# Patient Record
Sex: FEMALE | Race: OTHER | Employment: UNEMPLOYED | URBAN - METROPOLITAN AREA
[De-identification: names, ages, dates, MRNs, and addresses within clinical notes are randomized per-mention and may not be internally consistent; named-entity substitution may affect disease eponyms.]

---

## 2017-03-09 ENCOUNTER — HOSPITAL ENCOUNTER (EMERGENCY)
Age: 37
Discharge: HOME | End: 2017-03-09
Attending: EMERGENCY MEDICINE
Payer: OTHER GOVERNMENT

## 2017-03-09 ENCOUNTER — APPOINTMENT (OUTPATIENT)
Dept: GENERAL RADIOLOGY | Age: 37
End: 2017-03-09
Payer: OTHER GOVERNMENT

## 2017-03-09 VITALS
OXYGEN SATURATION: 99 % | HEIGHT: 62 IN | TEMPERATURE: 98.6 F | HEART RATE: 86 BPM | SYSTOLIC BLOOD PRESSURE: 98 MMHG | DIASTOLIC BLOOD PRESSURE: 66 MMHG | BODY MASS INDEX: 23.55 KG/M2 | WEIGHT: 128 LBS | RESPIRATION RATE: 20 BRPM

## 2017-03-09 DIAGNOSIS — T14.8XXA MUSCLE STRAIN: Primary | ICD-10-CM

## 2017-03-09 LAB — HCG UR QL: NEGATIVE

## 2017-03-09 PROCEDURE — 74011250637 HC RX REV CODE- 250/637: Performed by: PHYSICIAN ASSISTANT

## 2017-03-09 PROCEDURE — 81025 URINE PREGNANCY TEST: CPT

## 2017-03-09 PROCEDURE — 71020 XR CHEST PA LAT: CPT

## 2017-03-09 PROCEDURE — 99284 EMERGENCY DEPT VISIT MOD MDM: CPT | Performed by: PHYSICIAN ASSISTANT

## 2017-03-09 RX ORDER — METHOCARBAMOL 750 MG/1
750 TABLET, FILM COATED ORAL
Status: COMPLETED | OUTPATIENT
Start: 2017-03-09 | End: 2017-03-09

## 2017-03-09 RX ORDER — LEVOTHYROXINE SODIUM 50 UG/1
TABLET ORAL
COMMUNITY
End: 2017-04-05 | Stop reason: SDDI

## 2017-03-09 RX ORDER — METHOCARBAMOL 750 MG/1
750 TABLET, FILM COATED ORAL 2 TIMES DAILY
Qty: 14 TAB | Refills: 0 | Status: SHIPPED | OUTPATIENT
Start: 2017-03-09 | End: 2017-03-16

## 2017-03-09 RX ADMIN — METHOCARBAMOL 750 MG: 750 TABLET ORAL at 18:58

## 2017-03-09 NOTE — ED PROVIDER NOTES
Patient is a 43 y.o. female presenting with back pain. The history is provided by the patient. Back Pain    This is a new problem. The current episode started more than 2 days ago. The problem has not changed since onset. The problem occurs constantly. The pain is associated with no known injury. The pain is present in the left side. The quality of the pain is described as aching. The pain does not radiate. The pain is at a severity of 8/10. The pain is mild. The pain is the same all the time. Pertinent negatives include no tingling and no weakness. She has tried nothing for the symptoms. The treatment provided no relief. Past Medical History:   Diagnosis Date    Endocrine disease     hypothyroidism       History reviewed. No pertinent surgical history. History reviewed. No pertinent family history. Social History     Social History    Marital status:      Spouse name: N/A    Number of children: N/A    Years of education: N/A     Occupational History    Not on file. Social History Main Topics    Smoking status: Not on file    Smokeless tobacco: Not on file    Alcohol use Not on file    Drug use: Not on file    Sexual activity: Not on file     Other Topics Concern    Not on file     Social History Narrative    No narrative on file         ALLERGIES: Review of patient's allergies indicates no known allergies. Review of Systems   Musculoskeletal: Positive for back pain. Neurological: Negative for tingling and weakness. All other systems reviewed and are negative. Vitals:    03/09/17 1759   BP: 98/66   Pulse: 86   Resp: 20   Temp: 98.6 °F (37 °C)   SpO2: 99%   Weight: 58.1 kg (128 lb)   Height: 5' 2\" (1.575 m)            Physical Exam   Constitutional: She is oriented to person, place, and time. She appears well-developed and well-nourished. No distress. HENT:   Head: Normocephalic and atraumatic.    Eyes: Conjunctivae and EOM are normal. Pupils are equal, round, and reactive to light. Neck: Normal range of motion. Neck supple. Cardiovascular: Normal rate and regular rhythm. Pulmonary/Chest: Effort normal and breath sounds normal. No respiratory distress. She has no wheezes. Abdominal: Soft. Bowel sounds are normal. There is no tenderness. There is no rebound and no guarding. Musculoskeletal: She exhibits tenderness. She exhibits no edema. Back:    Left posterior rib area w/o skin changes, no swelling, lungs clear    Neurological: She is alert and oriented to person, place, and time. Skin: Skin is warm and dry. Psychiatric: She has a normal mood and affect. Nursing note and vitals reviewed.        MDM  Number of Diagnoses or Management Options  Diagnosis management comments: Chest x ray negative, do not feel this is singles at this time due to symptoms x 3 days and no skin changes, will give short course of robaxin for muscular pain       Amount and/or Complexity of Data Reviewed  Clinical lab tests: ordered and reviewed  Tests in the radiology section of CPT®: ordered and reviewed    Risk of Complications, Morbidity, and/or Mortality  Presenting problems: low  Diagnostic procedures: low  Management options: low    Patient Progress  Patient progress: improved    ED Course       Procedures

## 2017-03-09 NOTE — ED TRIAGE NOTES
Patient c/o left sided upper back pain x 1 week. Patient states pain increases with movement. Patient states she feels like \"something is moving there. \"  Patient denies rashes to her skin.

## 2017-03-10 NOTE — DISCHARGE INSTRUCTIONS
Muscle Strain: Care Instructions  Your Care Instructions  A muscle strain happens when you overstretch, or pull, a muscle. It can happen when you exercise or lift something or when you have an accident. Rest and other home care can help the muscle heal.  Follow-up care is a key part of your treatment and safety. Be sure to make and go to all appointments, and call your doctor if you are having problems. Its also a good idea to know your test results and keep a list of the medicines you take. How can you care for yourself at home? · Rest the strained muscle. Do not put weight on it for a day or two. If your doctor advises you to, use crutches or a sling to rest a sore limb. · Put ice or a cold pack on the sore muscle for 10 to 20 minutes at a time to stop swelling. Put a thin cloth between the ice pack and your skin. · Prop up the sore arm or leg on a pillow when you ice it or anytime you sit or lie down during the next 3 days. Try to keep it above the level of your heart. This will help reduce swelling. · Take pain medicines exactly as directed. ¨ If the doctor gave you a prescription medicine for pain, take it as prescribed. ¨ If you are not taking a prescription pain medicine, ask your doctor if you can take an over-the-counter medicine. · Do not do anything that makes the pain worse. Return to exercise gradually as you feel better. When should you call for help? Call your doctor now or seek immediate medical care if:  · You have new severe pain. · Your injured limb is cool or pale or changes color. · You have tingling, weakness, or numbness in your injured limb. · You cannot move the injured area. Watch closely for changes in your health, and be sure to contact your doctor if:  · You cannot put weight on a joint, or it feels unsteady when you walk. · Pain and swelling get worse or do not start to get better after 2 days of home treatment. Where can you learn more?   Go to http://nan-dandy.info/. Enter P728 in the search box to learn more about \"Muscle Strain: Care Instructions. \"  Current as of: May 23, 2016  Content Version: 11.1  © 7876-5471 [employer]. Care instructions adapted under license by [employer] (which disclaims liability or warranty for this information). If you have questions about a medical condition or this instruction, always ask your healthcare professional. [place] [address] any warranty or liability for your use of this information.

## 2018-03-12 ENCOUNTER — HOSPITAL ENCOUNTER (EMERGENCY)
Age: 38
Discharge: HOME | End: 2018-03-12
Attending: EMERGENCY MEDICINE
Payer: OTHER GOVERNMENT

## 2018-03-12 VITALS
DIASTOLIC BLOOD PRESSURE: 77 MMHG | SYSTOLIC BLOOD PRESSURE: 123 MMHG | HEIGHT: 61 IN | OXYGEN SATURATION: 98 % | WEIGHT: 113 LBS | BODY MASS INDEX: 21.34 KG/M2 | HEART RATE: 74 BPM | TEMPERATURE: 98 F | RESPIRATION RATE: 16 BRPM

## 2018-03-12 DIAGNOSIS — K29.90 GASTRITIS AND DUODENITIS: Primary | ICD-10-CM

## 2018-03-12 LAB
ALBUMIN SERPL-MCNC: 4.3 G/DL (ref 3.5–5)
ALBUMIN/GLOB SERPL: 1 {RATIO} (ref 1.2–3.5)
ALP SERPL-CCNC: 49 U/L (ref 50–136)
ALT SERPL-CCNC: 23 U/L (ref 12–65)
ANION GAP SERPL CALC-SCNC: 15 MMOL/L (ref 7–16)
AST SERPL-CCNC: 18 U/L (ref 15–37)
BACTERIA URNS QL MICRO: ABNORMAL /HPF
BASOPHILS # BLD: 0 K/UL (ref 0–0.2)
BASOPHILS NFR BLD: 0 % (ref 0–2)
BILIRUB SERPL-MCNC: 0.9 MG/DL (ref 0.2–1.1)
BUN SERPL-MCNC: 18 MG/DL (ref 6–23)
CALCIUM SERPL-MCNC: 9.4 MG/DL (ref 8.3–10.4)
CASTS URNS QL MICRO: ABNORMAL /LPF
CHLORIDE SERPL-SCNC: 101 MMOL/L (ref 98–107)
CO2 SERPL-SCNC: 26 MMOL/L (ref 21–32)
CREAT SERPL-MCNC: 0.76 MG/DL (ref 0.6–1)
CRYSTALS URNS QL MICRO: 0 /LPF
DIFFERENTIAL METHOD BLD: ABNORMAL
EOSINOPHIL # BLD: 0 K/UL (ref 0–0.8)
EOSINOPHIL NFR BLD: 0 % (ref 0.5–7.8)
EPI CELLS #/AREA URNS HPF: ABNORMAL /HPF
ERYTHROCYTE [DISTWIDTH] IN BLOOD BY AUTOMATED COUNT: 13.2 % (ref 11.9–14.6)
GLOBULIN SER CALC-MCNC: 4.2 G/DL (ref 2.3–3.5)
GLUCOSE SERPL-MCNC: 96 MG/DL (ref 65–100)
HCG UR QL: NEGATIVE
HCT VFR BLD AUTO: 43.4 % (ref 35.8–46.3)
HGB BLD-MCNC: 15.1 G/DL (ref 11.7–15.4)
IMM GRANULOCYTES # BLD: 0 K/UL (ref 0–0.5)
IMM GRANULOCYTES NFR BLD AUTO: 0 % (ref 0–5)
LIPASE SERPL-CCNC: 137 U/L (ref 73–393)
LYMPHOCYTES # BLD: 1 K/UL (ref 0.5–4.6)
LYMPHOCYTES NFR BLD: 11 % (ref 13–44)
MCH RBC QN AUTO: 30.1 PG (ref 26.1–32.9)
MCHC RBC AUTO-ENTMCNC: 34.8 G/DL (ref 31.4–35)
MCV RBC AUTO: 86.5 FL (ref 79.6–97.8)
MONOCYTES # BLD: 1 K/UL (ref 0.1–1.3)
MONOCYTES NFR BLD: 11 % (ref 4–12)
MUCOUS THREADS URNS QL MICRO: ABNORMAL /LPF
NEUTS SEG # BLD: 7.1 K/UL (ref 1.7–8.2)
NEUTS SEG NFR BLD: 78 % (ref 43–78)
PLATELET # BLD AUTO: 236 K/UL (ref 150–450)
PMV BLD AUTO: 10.9 FL (ref 10.8–14.1)
POTASSIUM SERPL-SCNC: 3.7 MMOL/L (ref 3.5–5.1)
PROT SERPL-MCNC: 8.5 G/DL (ref 6.3–8.2)
RBC # BLD AUTO: 5.02 M/UL (ref 4.05–5.25)
RBC #/AREA URNS HPF: ABNORMAL /HPF
SODIUM SERPL-SCNC: 142 MMOL/L (ref 136–145)
TSH SERPL DL<=0.005 MIU/L-ACNC: 4.27 UIU/ML (ref 0.36–3.74)
WBC # BLD AUTO: 9.2 K/UL (ref 4.3–11.1)
WBC URNS QL MICRO: ABNORMAL /HPF

## 2018-03-12 PROCEDURE — 74011250637 HC RX REV CODE- 250/637: Performed by: EMERGENCY MEDICINE

## 2018-03-12 PROCEDURE — 84443 ASSAY THYROID STIM HORMONE: CPT | Performed by: EMERGENCY MEDICINE

## 2018-03-12 PROCEDURE — 80053 COMPREHEN METABOLIC PANEL: CPT | Performed by: EMERGENCY MEDICINE

## 2018-03-12 PROCEDURE — 83690 ASSAY OF LIPASE: CPT | Performed by: EMERGENCY MEDICINE

## 2018-03-12 PROCEDURE — 99284 EMERGENCY DEPT VISIT MOD MDM: CPT | Performed by: EMERGENCY MEDICINE

## 2018-03-12 PROCEDURE — 74011250636 HC RX REV CODE- 250/636: Performed by: EMERGENCY MEDICINE

## 2018-03-12 PROCEDURE — 85025 COMPLETE CBC W/AUTO DIFF WBC: CPT | Performed by: EMERGENCY MEDICINE

## 2018-03-12 PROCEDURE — 81003 URINALYSIS AUTO W/O SCOPE: CPT | Performed by: EMERGENCY MEDICINE

## 2018-03-12 PROCEDURE — 96361 HYDRATE IV INFUSION ADD-ON: CPT | Performed by: EMERGENCY MEDICINE

## 2018-03-12 PROCEDURE — 96374 THER/PROPH/DIAG INJ IV PUSH: CPT | Performed by: EMERGENCY MEDICINE

## 2018-03-12 PROCEDURE — 74011000250 HC RX REV CODE- 250: Performed by: EMERGENCY MEDICINE

## 2018-03-12 PROCEDURE — 81025 URINE PREGNANCY TEST: CPT

## 2018-03-12 PROCEDURE — 81015 MICROSCOPIC EXAM OF URINE: CPT | Performed by: EMERGENCY MEDICINE

## 2018-03-12 RX ORDER — LIDOCAINE HYDROCHLORIDE 20 MG/ML
15 SOLUTION OROPHARYNGEAL
Status: COMPLETED | OUTPATIENT
Start: 2018-03-12 | End: 2018-03-12

## 2018-03-12 RX ORDER — ONDANSETRON 4 MG/1
4 TABLET, ORALLY DISINTEGRATING ORAL
Qty: 20 TAB | Refills: 0 | Status: ON HOLD | OUTPATIENT
Start: 2018-03-12 | End: 2018-03-18

## 2018-03-12 RX ORDER — ONDANSETRON 2 MG/ML
4 INJECTION INTRAMUSCULAR; INTRAVENOUS
Status: COMPLETED | OUTPATIENT
Start: 2018-03-12 | End: 2018-03-12

## 2018-03-12 RX ORDER — PANTOPRAZOLE SODIUM 40 MG/1
40 TABLET, DELAYED RELEASE ORAL DAILY
Qty: 20 TAB | Refills: 0 | Status: ON HOLD | OUTPATIENT
Start: 2018-03-12 | End: 2018-03-18

## 2018-03-12 RX ADMIN — Medication 30 ML: at 10:16

## 2018-03-12 RX ADMIN — LIDOCAINE HYDROCHLORIDE 15 ML: 20 SOLUTION ORAL; TOPICAL at 10:16

## 2018-03-12 RX ADMIN — ONDANSETRON 4 MG: 2 INJECTION INTRAMUSCULAR; INTRAVENOUS at 10:16

## 2018-03-12 RX ADMIN — SODIUM CHLORIDE 1000 ML: 900 INJECTION, SOLUTION INTRAVENOUS at 10:16

## 2018-03-12 NOTE — DISCHARGE INSTRUCTIONS
Gastritis: Care Instructions  Your Care Instructions    Gastritis is a sore and upset stomach. It happens when something irritates the stomach lining. Many things can cause it. These include an infection such as the flu or something you ate or drank. Medicines or a sore on the lining of the stomach (ulcer) also can cause it. Your belly may bloat and ache. You may belch, vomit, and feel sick to your stomach. You should be able to relieve the problem by taking medicine. And it may help to change your diet. If gastritis lasts, your doctor may prescribe medicine. Follow-up care is a key part of your treatment and safety. Be sure to make and go to all appointments, and call your doctor if you are having problems. It's also a good idea to know your test results and keep a list of the medicines you take. How can you care for yourself at home? · If your doctor prescribed antibiotics, take them as directed. Do not stop taking them just because you feel better. You need to take the full course of antibiotics. · Be safe with medicines. If your doctor prescribed medicine to decrease stomach acid, take it as directed. Call your doctor if you think you are having a problem with your medicine. · Do not take any other medicine, including over-the-counter pain relievers, without talking to your doctor first.  · If your doctor recommends over-the-counter medicine to reduce stomach acid, such as Pepcid AC, Prilosec, Tagamet HB, or Zantac 75, follow the directions on the label. · Drink plenty of fluids (enough so that your urine is light yellow or clear like water) to prevent dehydration. Choose water and other caffeine-free clear liquids. If you have kidney, heart, or liver disease and have to limit fluids, talk with your doctor before you increase the amount of fluids you drink. · Limit how much alcohol you drink. · Avoid coffee, tea, cola drinks, chocolate, and other foods with caffeine.  They increase stomach acid.  When should you call for help? Call 911 anytime you think you may need emergency care. For example, call if:  ? · You vomit blood or what looks like coffee grounds. ? · You pass maroon or very bloody stools. ?Call your doctor now or seek immediate medical care if:  ? · You start breathing fast and have not produced urine in the last 8 hours. ? · You cannot keep fluids down. ? Watch closely for changes in your health, and be sure to contact your doctor if:  ? · You do not get better as expected. Where can you learn more? Go to http://nan-dandy.info/. Enter [id number] in the search box to learn more about \"Gastritis: Care Instructions. \"  Current as of: May 12, 2017  Content Version: 11.4  © 9938-7082 [employer]. Care instructions adapted under license by [employer] (which disclaims liability or warranty for this information). If you have questions about a medical condition or this instruction, always ask your healthcare professional. [address] any warranty or liability for your use of this information.

## 2018-03-12 NOTE — ED NOTES
I have reviewed discharge instructions with the patient and spouse. The patient and spouse verbalized understanding. Patient left ED via Discharge Method: ambulatory to Home with spouse). Opportunity for questions and clarification provided. Patient given 2 scripts. To continue your aftercare when you leave the hospital, you may receive an automated call from our care team to check in on how you are doing. This is a free service and part of our promise to provide the best care and service to meet your aftercare needs.  If you have questions, or wish to unsubscribe from this service please call [phone]. Thank you for Choosing our [facility] Emergency Department.

## 2018-03-12 NOTE — ED PROVIDER NOTES
HPI Comments: Patient with nausea and vomiting for the past 4 days. Denies any abdominal pain. No diarrhea or constipation. No dysuria. No vaginal bleeding or discharge. Patient is a 42 y.o. female presenting with vomiting. The history is provided by the patient. No  was used. Vomiting    This is a new problem. The current episode started more than 2 days ago. The problem occurs 2 to 4 times per day. The problem has been gradually worsening. The emesis has an appearance of stomach contents. There has been no fever. Pertinent negatives include no chills, no fever, no abdominal pain, no diarrhea, no headaches, no myalgias, no cough, no URI and no headaches. Past Medical History:   Diagnosis Date    Endocrine disease     hypothyroidism       History reviewed. No pertinent surgical history. History reviewed. No pertinent family history. Social History     Social History    Marital status:      Spouse name: N/A    Number of children: N/A    Years of education: N/A     Occupational History    Not on file. Social History Main Topics    Smoking status: Never Smoker    Smokeless tobacco: Never Used    Alcohol use No    Drug use: Not on file    Sexual activity: Not on file     Other Topics Concern    Not on file     Social History Narrative         ALLERGIES: Review of patient's allergies indicates no known allergies. Review of Systems   Constitutional: Negative for chills and fever. HENT: Negative for rhinorrhea and sore throat. Eyes: Negative for pain and redness. Respiratory: Negative for cough, chest tightness, shortness of breath and wheezing. Cardiovascular: Negative for chest pain and leg swelling. Gastrointestinal: Positive for nausea and vomiting. Negative for abdominal pain and diarrhea. Genitourinary: Negative for dysuria, hematuria, vaginal bleeding and vaginal discharge.    Musculoskeletal: Negative for back pain, gait problem, myalgias, neck pain and neck stiffness. Skin: Negative for color change and rash. Neurological: Negative for weakness, numbness and headaches. Vitals:    03/12/18 0936   BP: 119/84   Pulse: 85   Resp: 16   Temp: 98 °F (36.7 °C)   SpO2: 99%   Weight: 51.3 kg (113 lb)   Height: 5' 1\" (1.549 m)            Physical Exam   Constitutional: She is oriented to person, place, and time. She appears well-developed and well-nourished. HENT:   Head: Normocephalic and atraumatic. Neck: Normal range of motion. Neck supple. Cardiovascular: Normal rate and regular rhythm. No murmur heard. Pulmonary/Chest: Effort normal and breath sounds normal. She has no wheezes. Abdominal: Soft. Bowel sounds are normal. There is tenderness (epigastric). There is no rebound and no guarding. Musculoskeletal: Normal range of motion. She exhibits no edema. Neurological: She is alert and oriented to person, place, and time. Skin: Skin is warm and dry. Nursing note and vitals reviewed. MDM  Number of Diagnoses or Management Options  Diagnosis management comments: Pt feeling better. Will discharge.         Amount and/or Complexity of Data Reviewed  Clinical lab tests: ordered and reviewed  Tests in the radiology section of CPT®: ordered and reviewed  Tests in the medicine section of CPT®: ordered and reviewed    Patient Progress  Patient progress: stable        ED Course       Procedures           Results Include:    Recent Results (from the past 24 hour(s))   HCG URINE, QL. - POC    Collection Time: 03/12/18  9:46 AM   Result Value Ref Range    Pregnancy test,urine (POC) NEGATIVE  NEG     URINE MICROSCOPIC    Collection Time: 03/12/18  9:47 AM   Result Value Ref Range    WBC 5-10 0 /hpf    RBC 0-3 0 /hpf    Epithelial cells 5-10 0 /hpf    Bacteria TRACE 0 /hpf    Casts HYALINE 0 /lpf    Crystals, urine 0 0 /LPF    Mucus 1+ (H) 0 /lpf   CBC WITH AUTOMATED DIFF    Collection Time: 03/12/18 10:13 AM   Result Value Ref Range    WBC 9.2 4.3 - 11.1 K/uL    RBC 5.02 4.05 - 5.25 M/uL    HGB 15.1 11.7 - 15.4 g/dL    HCT 43.4 35.8 - 46.3 %    MCV 86.5 79.6 - 97.8 FL    MCH 30.1 26.1 - 32.9 PG    MCHC 34.8 31.4 - 35.0 g/dL    RDW 13.2 11.9 - 14.6 %    PLATELET 540 343 - 941 K/uL    MPV 10.9 10.8 - 14.1 FL    DF AUTOMATED      NEUTROPHILS 78 43 - 78 %    LYMPHOCYTES 11 (L) 13 - 44 %    MONOCYTES 11 4.0 - 12.0 %    EOSINOPHILS 0 (L) 0.5 - 7.8 %    BASOPHILS 0 0.0 - 2.0 %    IMMATURE GRANULOCYTES 0 0.0 - 5.0 %    ABS. NEUTROPHILS 7.1 1.7 - 8.2 K/UL    ABS. LYMPHOCYTES 1.0 0.5 - 4.6 K/UL    ABS. MONOCYTES 1.0 0.1 - 1.3 K/UL    ABS. EOSINOPHILS 0.0 0.0 - 0.8 K/UL    ABS. BASOPHILS 0.0 0.0 - 0.2 K/UL    ABS. IMM. GRANS. 0.0 0.0 - 0.5 K/UL   METABOLIC PANEL, COMPREHENSIVE    Collection Time: 03/12/18 10:13 AM   Result Value Ref Range    Sodium 142 136 - 145 mmol/L    Potassium 3.7 3.5 - 5.1 mmol/L    Chloride 101 98 - 107 mmol/L    CO2 26 21 - 32 mmol/L    Anion gap 15 7 - 16 mmol/L    Glucose 96 65 - 100 mg/dL    BUN 18 6 - 23 MG/DL    Creatinine 0.76 0.6 - 1.0 MG/DL    GFR est AA >60 >60 ml/min/1.73m2    GFR est non-AA >60 >60 ml/min/1.73m2    Calcium 9.4 8.3 - 10.4 MG/DL    Bilirubin, total 0.9 0.2 - 1.1 MG/DL    ALT (SGPT) 23 12 - 65 U/L    AST (SGOT) 18 15 - 37 U/L    Alk.  phosphatase 49 (L) 50 - 136 U/L    Protein, total 8.5 (H) 6.3 - 8.2 g/dL    Albumin 4.3 3.5 - 5.0 g/dL    Globulin 4.2 (H) 2.3 - 3.5 g/dL    A-G Ratio 1.0 (L) 1.2 - 3.5     LIPASE    Collection Time: 03/12/18 10:13 AM   Result Value Ref Range    Lipase 137 73 - 393 U/L   TSH 3RD GENERATION    Collection Time: 03/12/18 10:13 AM   Result Value Ref Range    TSH 4.271 (H) 0.358 - 3.740 uIU/mL

## 2018-03-18 ENCOUNTER — HOSPITAL ENCOUNTER (OUTPATIENT)
Age: 38
Setting detail: OBSERVATION
Discharge: HOME | End: 2018-03-19
Attending: INTERNAL MEDICINE | Admitting: INTERNAL MEDICINE
Payer: OTHER GOVERNMENT

## 2018-03-18 ENCOUNTER — APPOINTMENT (OUTPATIENT)
Dept: GENERAL RADIOLOGY | Age: 38
End: 2018-03-18
Attending: EMERGENCY MEDICINE
Payer: OTHER GOVERNMENT

## 2018-03-18 ENCOUNTER — HOSPITAL ENCOUNTER (EMERGENCY)
Age: 38
Discharge: HOSPICE | End: 2018-03-18
Attending: EMERGENCY MEDICINE
Payer: OTHER GOVERNMENT

## 2018-03-18 VITALS
DIASTOLIC BLOOD PRESSURE: 71 MMHG | HEART RATE: 78 BPM | BODY MASS INDEX: 19.49 KG/M2 | SYSTOLIC BLOOD PRESSURE: 112 MMHG | HEIGHT: 63 IN | RESPIRATION RATE: 17 BRPM | OXYGEN SATURATION: 100 % | TEMPERATURE: 97.6 F | WEIGHT: 110 LBS

## 2018-03-18 DIAGNOSIS — R07.9 CHEST PAIN, UNSPECIFIED TYPE: Primary | ICD-10-CM

## 2018-03-18 DIAGNOSIS — R94.31 ABNORMAL EKG: ICD-10-CM

## 2018-03-18 LAB
ALBUMIN SERPL-MCNC: 3.8 G/DL (ref 3.5–5)
ALBUMIN/GLOB SERPL: 1 {RATIO} (ref 1.2–3.5)
ALP SERPL-CCNC: 47 U/L (ref 50–136)
ALT SERPL-CCNC: 15 U/L (ref 12–65)
ANION GAP SERPL CALC-SCNC: 12 MMOL/L (ref 7–16)
AST SERPL-CCNC: 16 U/L (ref 15–37)
ATRIAL RATE: 102 BPM
ATRIAL RATE: 76 BPM
BASOPHILS # BLD: 0 K/UL (ref 0–0.2)
BASOPHILS NFR BLD: 0 % (ref 0–2)
BILIRUB DIRECT SERPL-MCNC: 0.1 MG/DL
BILIRUB SERPL-MCNC: 0.6 MG/DL (ref 0.2–1.1)
BUN SERPL-MCNC: 9 MG/DL (ref 6–23)
CALCIUM SERPL-MCNC: 9.6 MG/DL (ref 8.3–10.4)
CALCULATED P AXIS, ECG09: 43 DEGREES
CALCULATED P AXIS, ECG09: 46 DEGREES
CALCULATED R AXIS, ECG10: 107 DEGREES
CALCULATED R AXIS, ECG10: 39 DEGREES
CALCULATED T AXIS, ECG11: -5 DEGREES
CALCULATED T AXIS, ECG11: -67 DEGREES
CHLORIDE SERPL-SCNC: 98 MMOL/L (ref 98–107)
CO2 SERPL-SCNC: 29 MMOL/L (ref 21–32)
CREAT SERPL-MCNC: 0.66 MG/DL (ref 0.6–1)
D DIMER PPP FEU-MCNC: 0.55 UG/ML(FEU)
DIAGNOSIS, 93000: NORMAL
DIAGNOSIS, 93000: NORMAL
DIFFERENTIAL METHOD BLD: ABNORMAL
EOSINOPHIL # BLD: 0 K/UL (ref 0–0.8)
EOSINOPHIL NFR BLD: 0 % (ref 0.5–7.8)
ERYTHROCYTE [DISTWIDTH] IN BLOOD BY AUTOMATED COUNT: 12.9 % (ref 11.9–14.6)
GLOBULIN SER CALC-MCNC: 3.9 G/DL (ref 2.3–3.5)
GLUCOSE SERPL-MCNC: 158 MG/DL (ref 65–100)
HCT VFR BLD AUTO: 41.4 % (ref 35.8–46.3)
HGB BLD-MCNC: 14.5 G/DL (ref 11.7–15.4)
IMM GRANULOCYTES # BLD: 0 K/UL (ref 0–0.5)
IMM GRANULOCYTES NFR BLD AUTO: 0 % (ref 0–5)
LYMPHOCYTES # BLD: 1.3 K/UL (ref 0.5–4.6)
LYMPHOCYTES NFR BLD: 11 % (ref 13–44)
MCH RBC QN AUTO: 29.4 PG (ref 26.1–32.9)
MCHC RBC AUTO-ENTMCNC: 35 G/DL (ref 31.4–35)
MCV RBC AUTO: 84 FL (ref 79.6–97.8)
MONOCYTES # BLD: 1.1 K/UL (ref 0.1–1.3)
MONOCYTES NFR BLD: 10 % (ref 4–12)
NEUTS SEG # BLD: 8.9 K/UL (ref 1.7–8.2)
NEUTS SEG NFR BLD: 79 % (ref 43–78)
P-R INTERVAL, ECG05: 128 MS
P-R INTERVAL, ECG05: 132 MS
PLATELET # BLD AUTO: 243 K/UL (ref 150–450)
PMV BLD AUTO: 10.8 FL (ref 10.8–14.1)
POTASSIUM SERPL-SCNC: 3 MMOL/L (ref 3.5–5.1)
PROT SERPL-MCNC: 7.7 G/DL (ref 6.3–8.2)
Q-T INTERVAL, ECG07: 338 MS
Q-T INTERVAL, ECG07: 390 MS
QRS DURATION, ECG06: 74 MS
QRS DURATION, ECG06: 78 MS
QTC CALCULATION (BEZET), ECG08: 438 MS
QTC CALCULATION (BEZET), ECG08: 440 MS
RBC # BLD AUTO: 4.93 M/UL (ref 4.05–5.25)
SODIUM SERPL-SCNC: 139 MMOL/L (ref 136–145)
TROPONIN I BLD-MCNC: 0.02 NG/ML (ref 0.02–0.05)
TROPONIN I SERPL-MCNC: <0.02 NG/ML (ref 0.02–0.05)
VENTRICULAR RATE, ECG03: 102 BPM
VENTRICULAR RATE, ECG03: 76 BPM
WBC # BLD AUTO: 11.3 K/UL (ref 4.3–11.1)

## 2018-03-18 PROCEDURE — 84484 ASSAY OF TROPONIN QUANT: CPT

## 2018-03-18 PROCEDURE — 74011000250 HC RX REV CODE- 250: Performed by: INTERNAL MEDICINE

## 2018-03-18 PROCEDURE — 94761 N-INVAS EAR/PLS OXIMETRY MLT: CPT | Performed by: EMERGENCY MEDICINE

## 2018-03-18 PROCEDURE — 80048 BASIC METABOLIC PNL TOTAL CA: CPT | Performed by: EMERGENCY MEDICINE

## 2018-03-18 PROCEDURE — 74011250637 HC RX REV CODE- 250/637: Performed by: EMERGENCY MEDICINE

## 2018-03-18 PROCEDURE — 36415 COLL VENOUS BLD VENIPUNCTURE: CPT | Performed by: NURSE PRACTITIONER

## 2018-03-18 PROCEDURE — 99218 HC RM OBSERVATION: CPT

## 2018-03-18 PROCEDURE — 93005 ELECTROCARDIOGRAM TRACING: CPT | Performed by: EMERGENCY MEDICINE

## 2018-03-18 PROCEDURE — 74011250636 HC RX REV CODE- 250/636: Performed by: EMERGENCY MEDICINE

## 2018-03-18 PROCEDURE — 74011250637 HC RX REV CODE- 250/637: Performed by: NURSE PRACTITIONER

## 2018-03-18 PROCEDURE — C8929 TTE W OR WO FOL WCON,DOPPLER: HCPCS

## 2018-03-18 PROCEDURE — 74011250636 HC RX REV CODE- 250/636: Performed by: INTERNAL MEDICINE

## 2018-03-18 PROCEDURE — 85379 FIBRIN DEGRADATION QUANT: CPT | Performed by: EMERGENCY MEDICINE

## 2018-03-18 PROCEDURE — 96360 HYDRATION IV INFUSION INIT: CPT | Performed by: EMERGENCY MEDICINE

## 2018-03-18 PROCEDURE — 80076 HEPATIC FUNCTION PANEL: CPT | Performed by: EMERGENCY MEDICINE

## 2018-03-18 PROCEDURE — 65660000000 HC RM CCU STEPDOWN

## 2018-03-18 PROCEDURE — 85025 COMPLETE CBC W/AUTO DIFF WBC: CPT | Performed by: EMERGENCY MEDICINE

## 2018-03-18 PROCEDURE — 71045 X-RAY EXAM CHEST 1 VIEW: CPT

## 2018-03-18 PROCEDURE — 93005 ELECTROCARDIOGRAM TRACING: CPT | Performed by: INTERNAL MEDICINE

## 2018-03-18 PROCEDURE — 99285 EMERGENCY DEPT VISIT HI MDM: CPT | Performed by: EMERGENCY MEDICINE

## 2018-03-18 PROCEDURE — 84484 ASSAY OF TROPONIN QUANT: CPT | Performed by: NURSE PRACTITIONER

## 2018-03-18 RX ORDER — POTASSIUM CHLORIDE 20 MEQ/1
40 TABLET, EXTENDED RELEASE ORAL 2 TIMES DAILY
Status: DISCONTINUED | OUTPATIENT
Start: 2018-03-18 | End: 2018-03-19 | Stop reason: HOSPADM

## 2018-03-18 RX ORDER — SODIUM CHLORIDE 0.9 % (FLUSH) 0.9 %
5-10 SYRINGE (ML) INJECTION AS NEEDED
Status: DISCONTINUED | OUTPATIENT
Start: 2018-03-18 | End: 2018-03-19 | Stop reason: HOSPADM

## 2018-03-18 RX ORDER — NITROGLYCERIN 0.4 MG/1
0.4 TABLET SUBLINGUAL
Status: DISCONTINUED | OUTPATIENT
Start: 2018-03-18 | End: 2018-03-19 | Stop reason: HOSPADM

## 2018-03-18 RX ORDER — SODIUM CHLORIDE 0.9 % (FLUSH) 0.9 %
5-10 SYRINGE (ML) INJECTION EVERY 8 HOURS
Status: DISCONTINUED | OUTPATIENT
Start: 2018-03-18 | End: 2018-03-18 | Stop reason: HOSPADM

## 2018-03-18 RX ORDER — PANTOPRAZOLE SODIUM 40 MG/1
40 TABLET, DELAYED RELEASE ORAL DAILY
Status: DISCONTINUED | OUTPATIENT
Start: 2018-03-19 | End: 2018-03-19 | Stop reason: HOSPADM

## 2018-03-18 RX ORDER — ONDANSETRON 8 MG/1
4 TABLET, ORALLY DISINTEGRATING ORAL
Status: DISCONTINUED | OUTPATIENT
Start: 2018-03-18 | End: 2018-03-19 | Stop reason: HOSPADM

## 2018-03-18 RX ORDER — GUAIFENESIN 100 MG/5ML
81 LIQUID (ML) ORAL DAILY
Status: DISCONTINUED | OUTPATIENT
Start: 2018-03-19 | End: 2018-03-19 | Stop reason: HOSPADM

## 2018-03-18 RX ORDER — SODIUM CHLORIDE 0.9 % (FLUSH) 0.9 %
5-10 SYRINGE (ML) INJECTION EVERY 8 HOURS
Status: DISCONTINUED | OUTPATIENT
Start: 2018-03-18 | End: 2018-03-19 | Stop reason: HOSPADM

## 2018-03-18 RX ORDER — SODIUM CHLORIDE 0.9 % (FLUSH) 0.9 %
5-10 SYRINGE (ML) INJECTION AS NEEDED
Status: DISCONTINUED | OUTPATIENT
Start: 2018-03-18 | End: 2018-03-18 | Stop reason: HOSPADM

## 2018-03-18 RX ORDER — GUAIFENESIN 100 MG/5ML
324 LIQUID (ML) ORAL
Status: COMPLETED | OUTPATIENT
Start: 2018-03-18 | End: 2018-03-18

## 2018-03-18 RX ORDER — MORPHINE SULFATE 2 MG/ML
2 INJECTION, SOLUTION INTRAMUSCULAR; INTRAVENOUS
Status: DISCONTINUED | OUTPATIENT
Start: 2018-03-18 | End: 2018-03-19 | Stop reason: HOSPADM

## 2018-03-18 RX ADMIN — Medication 5 ML: at 21:29

## 2018-03-18 RX ADMIN — NITROGLYCERIN 1 INCH: 20 OINTMENT TOPICAL at 13:29

## 2018-03-18 RX ADMIN — ASPIRIN 81 MG 324 MG: 81 TABLET ORAL at 13:28

## 2018-03-18 RX ADMIN — Medication 5 ML: at 22:00

## 2018-03-18 RX ADMIN — PERFLUTREN 1 ML: 6.52 INJECTION, SUSPENSION INTRAVENOUS at 15:00

## 2018-03-18 RX ADMIN — POTASSIUM CHLORIDE 40 MEQ: 20 TABLET, EXTENDED RELEASE ORAL at 17:31

## 2018-03-18 RX ADMIN — Medication 5 ML: at 16:10

## 2018-03-18 RX ADMIN — SODIUM CHLORIDE 1000 ML: 900 INJECTION, SOLUTION INTRAVENOUS at 13:31

## 2018-03-18 NOTE — ED TRIAGE NOTES
Pt reports smelling a \"gas\" in her home. When she is smelling this, she complains of left sided chest pain.

## 2018-03-18 NOTE — H&P
[facility] Cardiology History & Physical      Date of  Admission: 3/18/2018  3:22 PM     CC: Chest pain    HPI:  Shannan Ann is a 42 y.o. female     No prior cardiac history. History of hypothyroidism, depression, noted prior to evaluation at [facility] for acute psychosis. Presented to the emergency room at [facility] [address] with chest  Discomfort. Patient is a very difficult historian who appears hinged on 'smell of gas' at home which causes her chest discomfort. States pressure-like sensation mostly noted with inhaling; no radiation or associated symptoms. States that when she leaves the house, all her symptoms resolved. Denies any dyspnea. Can walk over a mil no issues. No exertional symptoms. Very difficult to redirect patient keeps focusing back on 'the smell of gas' at home causing her symptoms. EKG at [facility]OS[facility] [address] with noted diffuse inferolateral T-wave inversions. [address] , prior EKG unremarkable. Patient was prescribed antidepressants which she is not taking and has also not been taking her thyroid medications     Past Medical History:   Diagnosis Date    Endocrine disease     hypothyroidism      Surgical history-hemorrhoidectomy    No Known Allergies   Social History     Social History    Marital status:      Spouse name: N/A    Number of children: N/A    Years of education: N/A     Occupational History    Not on file. Social History Main Topics    Smoking status: Never Smoker    Smokeless tobacco: Never Used    Alcohol use No    Drug use: No    Sexual activity: Not on file     Other Topics Concern    Not on file     Social History Narrative     History reviewed. No pertinent family history.      Current Facility-Administered Medications   Medication Dose Route Frequency    ondansetron (ZOFRAN ODT) tablet 4 mg  4 mg Oral Q8H PRN    [START ON 3/19/2018] pantoprazole (PROTONIX) tablet 40 mg  40 mg Oral DAILY    sodium chloride (NS) flush 5-10 mL  5-10 mL IntraVENous Q8H    sodium chloride (NS) flush 5-10 mL  5-10 mL IntraVENous PRN    sodium chloride (NS) flush 5-10 mL  5-10 mL IntraVENous Q8H    sodium chloride (NS) flush 5-10 mL  5-10 mL IntraVENous PRN    [START ON 3/19/2018] aspirin chewable tablet 81 mg  81 mg Oral DAILY    nitroglycerin (NITROSTAT) tablet 0.4 mg  0.4 mg SubLINGual Q5MIN PRN    morphine injection 2 mg  2 mg IntraVENous Q4H PRN    perflutren lipid microspheres (DEFINITY) in NS bolus IV  1 mL IntraVENous PRN       Review of Systems    Review of Systems   Constitution: Negative for chills, decreased appetite, fever, malaise/fatigue, weight gain and weight loss. HENT: Negative for hearing loss and sore throat. Eyes: Negative for blurred vision and double vision. Cardiovascular: Positive for chest pain. Negative for dyspnea on exertion, orthopnea, palpitations, paroxysmal nocturnal dyspnea and syncope. Respiratory: Negative for cough and shortness of breath. Endocrine: Negative for cold intolerance and heat intolerance. Hematologic/Lymphatic: Negative for bleeding problem. Musculoskeletal: Negative for falls, muscle cramps, muscle weakness and myalgias. Gastrointestinal: Negative for abdominal pain, hematemesis, hematochezia and melena. Neurological: Negative for dizziness and headaches. Psychiatric/Behavioral: Negative for altered mental status. The patient is not nervous/anxious.            Subjective:     Visit Vitals    /77 (BP 1 Location: Left arm, BP Patient Position: At rest)    Pulse 73    Temp 98.1 °F (36.7 °C)    Resp 16    Ht 5' 3\" (1.6 m)    Wt 52.1 kg (114 lb 14.4 oz)    SpO2 100%    BMI 20.35 kg/m2               Physical Exam:  General: Well Developed, Well Nourished, No Acute Distress  HEENT: pupils equal and round, no abnormalities noted  Neck: supple, no JVD, no carotid bruits  Heart: S1S2 with RRR without murmurs or gallops  Lungs: Clear throughout auscultation bilaterally without adventitious sounds  Abd: soft, nontender, nondistended, with good bowel sounds  Ext: warm, no edema, calves supple/nontender, pulses 2+ bilaterally  Skin: warm and dry  Psychiatric: Normal mood and affect  Neurologic: Alert and oriented X 3    Labs:   Recent Results (from the past 24 hour(s))   EKG, 12 LEAD, INITIAL    Collection Time: 03/18/18 12:44 PM   Result Value Ref Range    Ventricular Rate 102 BPM    Atrial Rate 102 BPM    P-R Interval 128 ms    QRS Duration 74 ms    Q-T Interval 338 ms    QTC Calculation (Bezet) 440 ms    Calculated P Axis 43 degrees    Calculated R Axis 107 degrees    Calculated T Axis -67 degrees    Diagnosis       Sinus tachycardia  Rightward axis  ST & T wave abnormality, consider inferior ischemia  ST & T wave abnormality, consider anterolateral ischemia  Abnormal ECG  No previous ECGs available     CBC WITH AUTOMATED DIFF    Collection Time: 03/18/18  1:07 PM   Result Value Ref Range    WBC 11.3 (H) 4.3 - 11.1 K/uL    RBC 4.93 4.05 - 5.25 M/uL    HGB 14.5 11.7 - 15.4 g/dL    HCT 41.4 35.8 - 46.3 %    MCV 84.0 79.6 - 97.8 FL    MCH 29.4 26.1 - 32.9 PG    MCHC 35.0 31.4 - 35.0 g/dL    RDW 12.9 11.9 - 14.6 %    PLATELET 817 681 - 039 K/uL    MPV 10.8 10.8 - 14.1 FL    DF AUTOMATED      NEUTROPHILS 79 (H) 43 - 78 %    LYMPHOCYTES 11 (L) 13 - 44 %    MONOCYTES 10 4.0 - 12.0 %    EOSINOPHILS 0 (L) 0.5 - 7.8 %    BASOPHILS 0 0.0 - 2.0 %    IMMATURE GRANULOCYTES 0 0.0 - 5.0 %    ABS. NEUTROPHILS 8.9 (H) 1.7 - 8.2 K/UL    ABS. LYMPHOCYTES 1.3 0.5 - 4.6 K/UL    ABS. MONOCYTES 1.1 0.1 - 1.3 K/UL    ABS. EOSINOPHILS 0.0 0.0 - 0.8 K/UL    ABS. BASOPHILS 0.0 0.0 - 0.2 K/UL    ABS. IMM.  GRANS. 0.0 0.0 - 0.5 K/UL   METABOLIC PANEL, BASIC    Collection Time: 03/18/18  1:07 PM   Result Value Ref Range    Sodium 139 136 - 145 mmol/L    Potassium 3.0 (L) 3.5 - 5.1 mmol/L    Chloride 98 98 - 107 mmol/L    CO2 29 21 - 32 mmol/L    Anion gap 12 7 - 16 mmol/L    Glucose 158 (H) 65 - 100 mg/dL    BUN 9 6 - 23 MG/DL    Creatinine 0.66 0.6 - 1.0 MG/DL    GFR est AA >60 >60 ml/min/1.73m2    GFR est non-AA >60 >60 ml/min/1.73m2    Calcium 9.6 8.3 - 10.4 MG/DL   HEPATIC FUNCTION PANEL    Collection Time: 03/18/18  1:07 PM   Result Value Ref Range    Protein, total 7.7 6.3 - 8.2 g/dL    Albumin 3.8 3.5 - 5.0 g/dL    Globulin 3.9 (H) 2.3 - 3.5 g/dL    A-G Ratio 1.0 (L) 1.2 - 3.5      Bilirubin, total 0.6 0.2 - 1.1 MG/DL    Bilirubin, direct 0.1 <0.4 MG/DL    Alk. phosphatase 47 (L) 50 - 136 U/L    AST (SGOT) 16 15 - 37 U/L    ALT (SGPT) 15 12 - 65 U/L   POC TROPONIN-I    Collection Time: 03/18/18  1:07 PM   Result Value Ref Range    Troponin-I (POC) 0.02 0.02 - 0.05 ng/ml   D DIMER    Collection Time: 03/18/18  1:07 PM   Result Value Ref Range    D DIMER 0.55 <0.56 ug/ml(FEU)   EKG, 12 LEAD, INITIAL    Collection Time: 03/18/18  3:47 PM   Result Value Ref Range    Ventricular Rate 76 BPM    Atrial Rate 76 BPM    P-R Interval 132 ms    QRS Duration 78 ms    Q-T Interval 390 ms    QTC Calculation (Bezet) 438 ms    Calculated P Axis 46 degrees    Calculated R Axis 39 degrees    Calculated T Axis -5 degrees    Diagnosis       Normal sinus rhythm  T wave abnormality, consider inferior ischemia  Abnormal ECG  When compared with ECG of 18-MAR-2018 12:44,  QRS axis Shifted left  T wave inversion no longer evident in Anterolateral leads          Assessment/Plan:      1. Atypical chest discomfort  2. History of depression  3. Hypothyroidism  4. Prior acute psychosis  5. Hypokalemia    Patient with atypical chest discomfort but initial EKG diffuse inferolateral T wave changes. Repeat EKG with improved EKG changes but still some residual inferior T-wave changes. Noted hypokalemia which could contribute; will replete. Rule out with serial enzymes. Plan echo in the morning. Consideration for stress testing if noted EKG changes persist with potassium repletion. Check free T4; TSH mildly elevated. Further recommendations pending clinical course.      Keagan GUZMÁN Brandon Leal MD  3/18/2018 4:25 PM

## 2018-03-18 NOTE — ED NOTES
TRANSFER - OUT REPORT:    Verbal report given to Mando RN on [address]  being transferred to [facility] for routine progression of care       Report consisted of patients Situation, Background, Assessment and   Recommendations(SBAR). Information from the following report(s) ED Summary and Cardiac Rhythm NSR was reviewed with the receiving nurse. Lines:   Peripheral IV 03/18/18 Right Antecubital (Active)   Site Assessment Clean, dry, & intact 3/18/2018  1:08 PM   Phlebitis Assessment 0 3/18/2018  1:08 PM   Infiltration Assessment 0 3/18/2018  1:08 PM   Dressing Status Clean, dry, & intact 3/18/2018  1:08 PM   Dressing Type Tape;Transparent 3/18/2018  1:08 PM   Hub Color/Line Status Pink;Flushed 3/18/2018  1:08 PM   Action Taken Blood drawn 3/18/2018  1:08 PM        Opportunity for questions and clarification was provided.       Patient transported with:   Monitor  Tech

## 2018-03-18 NOTE — PROGRESS NOTES
TRANSFER - IN REPORT:    Verbal report received from Go Barcenas RN(name) on [address] [facility]  being received from [facility](unit) for routine progression of care      Report consisted of patients Situation, Background, Assessment and   Recommendations(SBAR). Information from the following report(s) SBAR, Kardex, OR Summary, MAR, Recent Results and Cardiac Rhythm NSR was reviewed with the receiving nurse. Opportunity for questions and clarification was provided. Assessment completed upon patients arrival to unit and care assumed.

## 2018-03-18 NOTE — ED PROVIDER NOTES
HPI Comments: 39-year-old [place] female presents complaining of persistent \"smell of gas\" and chest pain. Her  reports that since then daily for the past 2 months. No one else can smell the gas in the house. She reportedly only smokes when she is at home. Was seen in the emergency department 2 days ago for nausea and vomiting but at that time she did not comment on the chest pain therefore was not evaluated. Details regarding the chest pain or difficult obtain as the patient will only focus on the gas smell. She does have a history of hypothyroidism and reportedly does not take Synthroid and she is prescribed.  reports she does not want to take any medications. No alcohol or drug use. No diagnosed psychiatric history. Patient is a 42 y.o. female presenting with chest pain. The history is provided by the patient and the spouse. Chest Pain (Angina)    Associated symptoms include headaches and nausea. Pertinent negatives include no abdominal pain, no back pain, no cough, no fever and no shortness of breath. Past Medical History:   Diagnosis Date    Endocrine disease     hypothyroidism       No past surgical history on file. No family history on file. Social History     Social History    Marital status:      Spouse name: N/A    Number of children: N/A    Years of education: N/A     Occupational History    Not on file. Social History Main Topics    Smoking status: Never Smoker    Smokeless tobacco: Never Used    Alcohol use No    Drug use: Not on file    Sexual activity: Not on file     Other Topics Concern    Not on file     Social History Narrative         ALLERGIES: Review of patient's allergies indicates no known allergies. Review of Systems   Constitutional: Negative for fever. HENT: Negative for congestion. Respiratory: Negative for cough and shortness of breath. Cardiovascular: Positive for chest pain.    Gastrointestinal: Positive for nausea. Negative for abdominal pain. Genitourinary: Negative for dysuria. Musculoskeletal: Negative for back pain and neck pain. Skin: Negative for rash. Neurological: Positive for headaches. Vitals:    03/18/18 1300 03/18/18 1308   BP: 106/71    Pulse: 100    Resp: 20    Temp: 97.2 °F (36.2 °C)    SpO2: 100% 100%   Weight: 49.9 kg (110 lb)    Height: 5' 3\" (1.6 m)             Physical Exam   Constitutional: She is oriented to person, place, and time. She appears well-developed and well-nourished. No distress. HENT:   Head: Normocephalic and atraumatic. Mouth/Throat: Oropharynx is clear and moist.   Eyes: Conjunctivae are normal. Pupils are equal, round, and reactive to light. Neck: Normal range of motion. Neck supple. Cardiovascular: Normal rate and regular rhythm. No murmur heard. Pulmonary/Chest: Effort normal and breath sounds normal. She has no wheezes. Abdominal: Soft. She exhibits no distension. There is no tenderness. Musculoskeletal: Normal range of motion. She exhibits no edema. Neurological: She is alert and oriented to person, place, and time. Skin: Skin is warm and dry. Psychiatric: She has a normal mood and affect. Nursing note and vitals reviewed. MDM  Number of Diagnoses or Management Options  Diagnosis management comments: EKG sinus tachycardia at 102 with inverted T waves inferior lateral.  No ST elevation. CBC white blood count 11.3. Troponin 0.02. Chest x-ray no acute abnormality. Patient treated with aspirin, Nitropaste and normal saline bolus. As the patient has very concerning EKG changes cardiology contacted.  Transfer downtown for further cardiac workup       Amount and/or Complexity of Data Reviewed  Clinical lab tests: ordered and reviewed  Tests in the radiology section of CPT®: ordered and reviewed  Tests in the medicine section of CPT®: ordered and reviewed  Independent visualization of images, tracings, or specimens: yes    Risk of Complications, Morbidity, and/or Mortality  Presenting problems: moderate  Diagnostic procedures: moderate  Management options: moderate          ED Course       Procedures

## 2018-03-18 NOTE — IP AVS SNAPSHOT
[address] 
[phone] Patient: Camila Butler MRN: [id number] :1980 A check steph indicates which time of day the medication should be taken. My Medications START taking these medications Instructions Each Dose to Equal  
 Morning Noon Evening Bedtime  
 levothyroxine 125 mcg tablet Commonly known as:  SYNTHROID Start taking on:  3/20/2018 Take 1 Tab by mouth Daily (before breakfast). 125 mcg CONTINUE taking these medications Instructions Each Dose to Equal  
 Morning Noon Evening Bedtime  
 pantoprazole 40 mg tablet Commonly known as:  PROTONIX Start taking on:  3/20/2018 Take 1 Tab by mouth daily. 40 mg Where to Get Your Medications Information on where to get these meds will be given to you by the nurse or doctor. ! Ask your nurse or doctor about these medications  
  levothyroxine 125 mcg tablet  
 pantoprazole 40 mg tablet

## 2018-03-18 NOTE — IP AVS SNAPSHOT
[address] [place] [id number] [address] 
[phone] Patient: Sophie Pires MRN: [id number] :1980 About your hospitalization You were admitted on:  2018 You last received care in the:  [facility] 3 TELEMETRY You were discharged on:  2018 Why you were hospitalized Your primary diagnosis was:  Not on File Your diagnoses also included:  Chest Pain Follow-up Information Follow up With Details Comments Contact Info Darrick Webb MD On 3/23/2018 at 8:45am, Hospital follow up [address] [facility] [place] [address] 
[phone] Discharge Orders None A check steph indicates which time of day the medication should be taken. My Medications START taking these medications Instructions Each Dose to Equal  
 Morning Noon Evening Bedtime  
 levothyroxine 125 mcg tablet Commonly known as:  SYNTHROID Start taking on:  3/20/2018 Take 1 Tab by mouth Daily (before breakfast). 125 mcg CONTINUE taking these medications Instructions Each Dose to Equal  
 Morning Noon Evening Bedtime  
 pantoprazole 40 mg tablet Commonly known as:  PROTONIX Start taking on:  3/20/2018 Take 1 Tab by mouth daily. 40 mg Where to Get Your Medications Information on where to get these meds will be given to you by the nurse or doctor. ! Ask your nurse or doctor about these medications  
  levothyroxine 125 mcg tablet  
 pantoprazole 40 mg tablet Discharge Instructions Dolor de pecho: Instrucciones de cuidado - [ Chest Pain: Care Instructions ] Instrucciones de cuidado El dolor de pecho puede tener muchas causas. Algunas no son graves y mejorarán por sí solas en pocos días.  [place] algunos tipos de dolor de [employer] Kathleene Patience y [place]. Es posible que holbrook médico le haya recomendado sarika visita de seguimiento dentro de las 8 a 12 horas siguientes. Si no mejora, es posible que necesite [address] pruebas o [place]. Aunque holbrook médico le haya dado de bever[place], es necesario que esté atento a cualquier problema que se presente. El médico le hizo un cuidadoso chequeo, kyra a veces los problemas pueden aparecer posteriormente. Si tiene nuevos síntomas o éstos no mejoran, obtenga atención médica de inmediato. Si tiene dolor o presión en el pecho que empeora o es diferente y que dura más de 5 minutos, o se desmayó (perdió el conocimiento), llame al 911 o busque otra ayuda de emergencia de inmediato. Acudir a sarika consulta médica es sólo un paso en holbrook tratamiento. Aunque se sienta mejor, todavía deberá hacer lo que holbrook médico le recomiende, nury asistir a todas las visitas de seguimiento sugeridas y janes los medicamentos exactamente [employer]	[place] fueron indicados. [place] le ayudará a recuperarse y prevenir problemas futuros. Cómo puede cuidarse en el hogar? · Descanse hasta que se sienta mejor. · [place] nabil medicamentos exactamente nury le fueron recetados. Llame a holbrook médico si zaira estar teniendo problemas con holbrook medicamento. · No conduzca después de janes un analgésico (medicamento para el dolor) recetado. Cuándo debe pedir ayuda? Llame al 911 si: 
? · Se desmayó (perdió el conocimiento). ? · Tiene graves dificultades para respirar. ? · Tiene síntomas de un ataque al corazón. Estos podrían incluir: ¨ Dolor o presión en el pecho, o sarika sensación extraña en el pecho. ¨ Sudoración. ¨ Falta de aire. ¨ Náuseas o vómito. ¨ Dolor, presión o sarika sensación extraña en la espalda, el alda, la mandíbula, la parte superior del abdomen o en yaquelin o ambos hombros o brazos. ¨ Aturdimiento o debilidad repentina. ¨ Latidos del corazón rápidos o irregulares. Después de llamar al 911, es posible que el operador le diga que mastique 1 aspirina para adultos o de 2 a 4 aspirinas de dosis baja. Espere a sarika ambulancia. No intente conducir usted mismo. ? Llame hoy a holbrook médico si: 
? · Tiene cualquier dificultad para respirar. ? · El dolor en el pecho empeora. ? · [employer] o aturdimiento, o que está a punto de [place]. ? · No mejora nury se esperaba. ? · Tiene dolor de pecho nuevo o diferente. Dónde puede encontrar más información en inglés? Collie Houston a http://nan-dandy.info/. Escriba A120 en la búsqueda para aprender más acerca de \"Dolor de pecho: Instrucciones de cuidado - [ Chest Pain: Care Instructions ]. \" 
Revisado: 20 marzo, 2017 Versión del contenido: 11.4 © 8081-1117 Healthwise, Incorporated. Las instrucciones de cuidado fueron adaptadas bajo licencia por [employer] (which disclaims liability or warranty for this information). Si usted tiene [place] afección médica o sobre estas instrucciones, siempre pregunte a holbrook profesional de tawanda. Healthwise, Incorporated niega toda garantía o responsabilidad por holbrook uso de esta información. Hipotiroidismo: Instrucciones de cuidado - [ Hypothyroidism: Care Instructions ] Instrucciones de cuidado Usted tiene hipotiroidismo, lo que significa que holbrook organismo no está fabricando suficiente hormona tiroidea. Esta hormona ayuda a que holbrook cuerpo use la energía. Si holbrook nivel de hormona tiroidea está bajo, usted podría sentirse cansado, estar estreñido, tener aumento de presión arterial, o tener piel seca o problemas de [place]. También podría tener frío con facilidad, aun cuando el clima sea caluroso. Las mujeres con bajo nivel de tiroides quizás experimenten períodos menstruales abundantes. Se [place] un análisis de mariah para detectar holbrook nivel de hormona estimulante de tiroides (TSH, por nabil siglas en inglés) y comprobar el hipotiroidismo. Un nivel alto de TSH podría significar que usted tiene [address] de tiroides bajo. Cuando holbrook cuerpo no está produciendo suficiente hormona tiroidea, el nivel de TSH se eleva tratando de hacer que el cuerpo [address]. El tratamiento para el hipotiroidismo es janes pastillas de hormona tiroidea. Onetha Booty a sentirse mejor en 1 a 2 semanas. [place] pueden pasar varios meses para miguel a cambios en el nivel de TSH. Necesita consultas regulares con holbrook médico para asegurarse de que esté tomando la dosis correcta de [place]. La mayoría de las personas necesitarán tratamiento por el alexsandra de nabil vidas. Jacqueline necesitará consultar regularmente al médico para hacerse análisis de Marcelo y asegurarse de que esté respondiendo edward. La atención de seguimiento es sarika parte clave de holbrook tratamiento y seguridad. Asegúrese de hacer y acudir a todas las citas, y llame a holbrook médico si está teniendo problemas. También es sarika buena idea saber los resultados de los exámenes y mantener sarika lista de los medicamentos que molly. Cómo puede cuidarse en el hogar? · [place] holbrook medicamento de hormona tiroidea exactamente nury le fue indicado. Llame a holbrook médico si zaira estar teniendo problemas con holbrook medicamento. La mayoría de las personas no tienen efectos secundarios si brie con regularidad la cantidad correcta de medicamento. ¨ [place] el medicamento 30 minutos antes del desayuno y no lo jami junto con calcio, vitaminas o fatimah. ¨ No intente janes dosis adicionales de holbrook medicamento para la tiroides. Eso no lo ayuda a sentirse mejor más pronto y podría provocar efectos secundarios. ¨ Si olvida janes sarika dosis, NO tome sarika dosis doble del [place]. [place] la dosis normal al día siguiente. · Infórmele a holbrook médico sobre todos los productos recetados, herbales o de venta conner que tome. · Cuídese. Siga sarika dieta saludable, duerma lo suficiente y jami ejercicio con regularidad. Cuándo debe pedir ayuda? Llame al 911 en cualquier momento que considere que necesita atención de emergencia. Por ejemplo, llame si: 
? · Se desmayó (perdió el conocimiento). ? · Tiene graves dificultades para respirar. ? · Tiene un ritmo cardíaco demasiado bajo (menos de 60 latidos por minuto). ? · Tiene sarika temperatura corporal baja (95°F [35°C] o brandon). ?Llame a holbrook médico ahora mismo o busque atención médica inmediata si: 
? · Se siente cansado, perezoso o débil. ? · Tiene dificultades para recordar cosas o concentrarse. ? · No empieza a sentirse mejor 2 semanas después de ildefonso empezado a janes el medicamento. ?Preste especial atención a los cambios en holbrook tawanda y asegúrese de comunicarse con holbrook médico si tiene algún problema. Dónde puede encontrar más información en inglés? Katja Aguilar a http://nan-dandy.info/. Shanna Nelson F154 en la búsqueda para aprender más acerca de \"Hipotiroidismo: Instrucciones de cuidado - [ Hypothyroidism: Care Instructions ]. \" 
Revisado: 12 mayo, 2017 Versión del contenido: 11.4 © 5163-0238 Healthwise, Incorporated. Las instrucciones de cuidado fueron adaptadas bajo licencia por [employer] (which disclaims liability or warranty for this information). Si usted tiene [place] afección médica o sobre estas instrucciones, siempre pregunte a holbrook profesional de tawanda. Healthwise, Incorporated niega toda garantía o responsabilidad por holbrook uso de esta información. DISCHARGE SUMMARY from Nurse PATIENT INSTRUCTIONS: 
 
After general anesthesia or intravenous sedation, for 24 hours or while taking prescription Narcotics: · Limit your activities · Do not drive and operate hazardous machinery · Do not make important personal or business decisions · Do  not drink alcoholic beverages · If you have not urinated within 8 hours after discharge, please contact your surgeon on call. Report the following to your surgeon: · Excessive pain, swelling, redness or odor of or around the surgical area · Temperature over 100.5 · Nausea and vomiting lasting longer than 4 hours or if unable to take medications · Any signs of decreased circulation or nerve impairment to extremity: change in color, persistent  numbness, tingling, coldness or increase pain · Any questions What to do at Home: *  Please give a list of your current medications to your Primary Care Provider. *  Please update this list whenever your medications are discontinued, doses are 
    changed, or new medications (including over-the-counter products) are added. *  Please carry medication information at all times in case of emergency situations. These are general instructions for a healthy lifestyle: No smoking/ No tobacco products/ Avoid exposure to second hand smoke Surgeon General's Warning:  Quitting smoking now greatly reduces serious risk to your health. Obesity, smoking, and sedentary lifestyle greatly increases your risk for illness A healthy diet, regular physical exercise & weight monitoring are important for maintaining a healthy lifestyle You may be retaining fluid if you have a history of heart failure or if you experience any of the following symptoms:  Weight gain of 3 pounds or more overnight or 5 pounds in a week, increased swelling in our hands or feet or shortness of breath while lying flat in bed. Please call your doctor as soon as you notice any of these symptoms; do not wait until your next office visit. Recognize signs and symptoms of STROKE: 
 
F-face looks uneven A-arms unable to move or move unevenly S-speech slurred or non-existent T-time-call 911 as soon as signs and symptoms begin-DO NOT go Back to bed or wait to see if you get better-TIME IS BRAIN. Warning Signs of HEART ATTACK Call 911 if you have these symptoms: 
? Chest discomfort.  Most heart attacks involve discomfort in the center of the chest that lasts more than a few minutes, or that goes away and comes back. It can feel like uncomfortable pressure, squeezing, fullness, or pain. ? Discomfort in other areas of the upper body. Symptoms can include pain or discomfort in one or both arms, the back, neck, jaw, or stomach. ? Shortness of breath with or without chest discomfort. ? Other signs may include breaking out in a cold sweat, nausea, or lightheadedness. Don't wait more than five minutes to call [address]! Fast action can save your life. Calling 911 is almost always the fastest way to get lifesaving treatment. Emergency Medical Services staff can begin treatment when they arrive  up to an hour sooner than if someone gets to the hospital by car. The discharge information has been reviewed with the patient. The patient verbalized understanding. Discharge medications reviewed with the patient and appropriate educational materials and side effects teaching were provided. ___________________________________________________________________________________________________________________________________ [facility] Announcement We are excited to announce that we are making your provider's discharge notes available to you in Datalot. You will see these notes when they are completed and signed by the physician that discharged you from your recent hospital stay. If you have any questions or concerns about any information you see in [employer], please call the Health Information Department where you were seen or reach out to your Primary Care Provider for more information about your plan of care. Introducing [facility]! Dear Iraida Sarmiento: Thank you for requesting a Datalot account. Our records indicate that you already have an active Datalot account. You can access your account anytime at [email or web address] Did you know that you can access your hospital and ER discharge instructions at any time in Webspyhart? You can also review all of your test results from your hospital stay or ER visit. Additional Information If you have questions, please visit the Frequently Asked Questions section of the [place]alo website at [email or web address] Remember, [email or web address] is NOT to be used for urgent needs. For medical emergencies, dial 911. Now available from your iPhone and Android! Unresulted Labs-Please follow up with your PCP about these lab tests Order Current Status NM MYOCARDIAL PERFUSION MULTIPLE In process Providers Seen During Your Hospitalization Provider Specialty Primary office phone Reg Ovalle MD Cardiology [phone] Immunizations Administered for This Admission Name Date Influenza Vaccine (Quad) PF  Deferred () Your Primary Care Physician (PCP) Primary Care Physician Office Phone Office Fax Earnest Collins [phone] [address] You are allergic to the following No active allergies Recent Documentation Height Weight BMI OB Status Smoking Status 1.6 m 51.3 kg 20.02 kg/m2 Having regular periods Never Smoker Emergency Contacts Name Discharge Info Relation Home Work Mobile Teofilo Snyder  Spouse [3] [phone] Patient Belongings The following personal items are in your possession at time of discharge: 
  Dental Appliances: None         Home Medications: None   Jewelry: Earrings, With patient  Clothing: Socks, Undergarments, Pants, Footwear, Shirt, At bedside    Other Valuables: Yamile 1925, [address] home Please provide this summary of care documentation to your next provider. Signatures-by signing, you are acknowledging that this After Visit Summary has been reviewed with you and you have received a copy. Patient Signature:  ____________________________________________________________ Date:  ____________________________________________________________  
  
Bryce November Provider Signature:  ____________________________________________________________ Date:  ____________________________________________________________

## 2018-03-18 NOTE — ED NOTES
[address] ambulance here to transport patient to [facility]. Report and paperwork given to paramedic.

## 2018-03-19 ENCOUNTER — APPOINTMENT (OUTPATIENT)
Dept: NUCLEAR MEDICINE | Age: 38
End: 2018-03-19
Attending: INTERNAL MEDICINE
Payer: OTHER GOVERNMENT

## 2018-03-19 VITALS
HEIGHT: 63 IN | WEIGHT: 113 LBS | BODY MASS INDEX: 20.02 KG/M2 | HEART RATE: 104 BPM | TEMPERATURE: 98.5 F | OXYGEN SATURATION: 99 % | RESPIRATION RATE: 18 BRPM | DIASTOLIC BLOOD PRESSURE: 81 MMHG | SYSTOLIC BLOOD PRESSURE: 111 MMHG

## 2018-03-19 LAB
ANION GAP SERPL CALC-SCNC: 12 MMOL/L (ref 7–16)
BUN SERPL-MCNC: 6 MG/DL (ref 6–23)
CALCIUM SERPL-MCNC: 8.3 MG/DL (ref 8.3–10.4)
CHLORIDE SERPL-SCNC: 102 MMOL/L (ref 98–107)
CHOLEST SERPL-MCNC: 124 MG/DL
CO2 SERPL-SCNC: 25 MMOL/L (ref 21–32)
CREAT SERPL-MCNC: 0.51 MG/DL (ref 0.6–1)
GLUCOSE SERPL-MCNC: 96 MG/DL (ref 65–100)
HDLC SERPL-MCNC: 39 MG/DL (ref 40–60)
HDLC SERPL: 3.2 {RATIO}
LDLC SERPL CALC-MCNC: 71.8 MG/DL
LIPID PROFILE,FLP: ABNORMAL
POTASSIUM SERPL-SCNC: 3.5 MMOL/L (ref 3.5–5.1)
SODIUM SERPL-SCNC: 139 MMOL/L (ref 136–145)
TRIGL SERPL-MCNC: 66 MG/DL (ref 35–150)
TROPONIN I SERPL-MCNC: <0.02 NG/ML (ref 0.02–0.05)
VLDLC SERPL CALC-MCNC: 13.2 MG/DL (ref 6–23)

## 2018-03-19 PROCEDURE — 99218 HC RM OBSERVATION: CPT

## 2018-03-19 PROCEDURE — 80061 LIPID PANEL: CPT | Performed by: NURSE PRACTITIONER

## 2018-03-19 PROCEDURE — 74011250636 HC RX REV CODE- 250/636: Performed by: INTERNAL MEDICINE

## 2018-03-19 PROCEDURE — 84484 ASSAY OF TROPONIN QUANT: CPT | Performed by: NURSE PRACTITIONER

## 2018-03-19 PROCEDURE — A9502 TC99M TETROFOSMIN: HCPCS

## 2018-03-19 PROCEDURE — 36415 COLL VENOUS BLD VENIPUNCTURE: CPT | Performed by: NURSE PRACTITIONER

## 2018-03-19 PROCEDURE — 80048 BASIC METABOLIC PNL TOTAL CA: CPT | Performed by: NURSE PRACTITIONER

## 2018-03-19 PROCEDURE — 93017 CV STRESS TEST TRACING ONLY: CPT | Performed by: INTERNAL MEDICINE

## 2018-03-19 RX ORDER — SODIUM CHLORIDE 0.9 % (FLUSH) 0.9 %
10 SYRINGE (ML) INJECTION
Status: COMPLETED | OUTPATIENT
Start: 2018-03-19 | End: 2018-03-19

## 2018-03-19 RX ORDER — LEVOTHYROXINE SODIUM 125 UG/1
125 TABLET ORAL
Qty: 30 TAB | Refills: 1 | Status: SHIPPED | OUTPATIENT
Start: 2018-03-20

## 2018-03-19 RX ORDER — PANTOPRAZOLE SODIUM 40 MG/1
40 TABLET, DELAYED RELEASE ORAL DAILY
Qty: 60 TAB | Refills: 3 | Status: SHIPPED | OUTPATIENT
Start: 2018-03-20

## 2018-03-19 RX ORDER — ACETAMINOPHEN 325 MG/1
650 TABLET ORAL
Status: DISCONTINUED | OUTPATIENT
Start: 2018-03-19 | End: 2018-03-19 | Stop reason: HOSPADM

## 2018-03-19 RX ADMIN — Medication 5 ML: at 05:12

## 2018-03-19 RX ADMIN — Medication 10 ML: at 09:18

## 2018-03-19 RX ADMIN — Medication 10 ML: at 10:53

## 2018-03-19 RX ADMIN — REGADENOSON 0.4 MG: 0.08 INJECTION, SOLUTION INTRAVENOUS at 10:53

## 2018-03-19 NOTE — PROCEDURES
Ashleigh Rodgers [address]  STRESS TEST    Yelena Zaragoza  MR#: [id number]  : 1980  ACCOUNT #: [de-identified]   DATE OF SERVICE: 2018    REFERRING PHYSICIAN:  Luis Felipe Blake MD.     PROCEDURE:  Adenosine Cardiolite for the evaluation of atypical chest pain. The patient underwent a standard protocol adenosine Cardiolite scan with 0.4 mg of Lexiscan. Baseline blood pressure 116/74, heart rate 78. Baseline EKG normal sinus rhythm, normal EKG. There were no EKG changes noted during Lexiscan infusion. Hemodynamics remained stable. Images were obtained with 10.65 mCi technetium 99m rest dose, 30 mCi stress dose. Images in the short axis, vertical long axis and horizontal long axis showed normal perfusion throughout all segments in all walls. Gated study showed normal left ventricular contractility. Normal wall motion, EF 65%. CONCLUSIONS:  Normal Lexiscan Cardiolite with no evidence of inducible ischemia. Normal wall motion. EF is 65.       MD KELSEA Ferrer / BRYAN  D: 2018 13:12     T: 2018 15:34  JOB #: [id number]  CC: Meena Gamez MD

## 2018-03-19 NOTE — PROGRESS NOTES
3/19/2018 7:13 AM    Admit Date: 3/18/2018    Admit Diagnosis: Chest Pain with EKG Changes; Chest pain      Subjective:    Patient with atypical cp but some non specific st changes.  [facility] today    Objective:      Visit Vitals    /84    Pulse 87    Temp 98.8 °F (37.1 °C)    Resp 18    Ht 5' 3\" (1.6 m)    Wt 51.3 kg (113 lb)    SpO2 98%    BMI 20.02 kg/m2       ROS:  General ROS: negative for - chills  Hematological and Lymphatic ROS: negative for - blood clots or jaundice  Respiratory ROS: no cough, shortness of breath, or wheezing  Cardiovascular ROS: no chest pain or dyspnea on exertion  Gastrointestinal ROS: no abdominal pain, change in bowel habits, or black or bloody stools  Neurological ROS: no TIA or stroke symptoms    Physical Exam:    Physical Examination: General appearance - alert, well appearing, and in no distress  Mental status - alert, oriented to person, place, and time  Eyes - pupils equal and reactive, extraocular eye movements intact  Neck/lymph - supple, no significant adenopathy  Chest/CV - clear to auscultation, no wheezes, rales or rhonchi, symmetric air entry  Heart - normal rate, regular rhythm, normal S1, S2, no murmurs, rubs, clicks or gallops  Abdomen/GI - soft, nontender, nondistended, no masses or organomegaly  Musculoskeletal - no joint tenderness, deformity or swelling  Extremities - peripheral pulses normal, no pedal edema, no clubbing or cyanosis  Skin - normal coloration and turgor, no rashes, no suspicious skin lesions noted    Current Facility-Administered Medications   Medication Dose Route Frequency    levothyroxine (SYNTHROID) tablet 125 mcg  125 mcg Oral ACB    ondansetron (ZOFRAN ODT) tablet 4 mg  4 mg Oral Q8H PRN    pantoprazole (PROTONIX) tablet 40 mg  40 mg Oral DAILY    sodium chloride (NS) flush 5-10 mL  5-10 mL IntraVENous Q8H    sodium chloride (NS) flush 5-10 mL  5-10 mL IntraVENous PRN    sodium chloride (NS) flush 5-10 mL  5-10 mL IntraVENous Q8H    sodium chloride (NS) flush 5-10 mL  5-10 mL IntraVENous PRN    aspirin chewable tablet 81 mg  81 mg Oral DAILY    nitroglycerin (NITROSTAT) tablet 0.4 mg  0.4 mg SubLINGual Q5MIN PRN    morphine injection 2 mg  2 mg IntraVENous Q4H PRN    potassium chloride (K-DUR, KLOR-CON) SR tablet 40 mEq  40 mEq Oral BID    influenza vaccine 2017-18 (3 yrs+)(PF) (FLUZONE QUAD/FLUARIX QUAD) injection 0.5 mL  0.5 mL IntraMUSCular PRIOR TO DISCHARGE       Data Review:   @LABRCNT(Na,K,BUN,CREA,WBC,HGB,HCT,PLT,INR,TRP,TCHOL*,Triglyceride*,LDL*,LDLCPOC HDL*,HDL])@    TELEMETRY: nsr    Assessment/Plan:     Active Problems:    Chest pain (3/18/2018) nuke    Hypothyroid.  Add synthroid          Michelle Wang MD

## 2018-03-19 NOTE — PROGRESS NOTES
Pt had Lexiscan nuclear stress test without complication. Pt was brought to [address] [place] [address] without complaints of CP, SOB or Palpitations. Pt was examined and had no wheezing. Pt was in NSR. Cardiac enzymes are negative. Pt was given Lexiscan injection. Pt had nausea, palpitations, shortness of breath initially that resolved in recovery. Pt was feeling well and BP and HR were stable. Test was ended and Pt will go for stress imaging.      Marni Isaacs, SELVIN

## 2018-03-19 NOTE — PROGRESS NOTES
Visit by spiritual volunteer Julissa Hoffman.     Mehran Hernandez, staff Sean moser 83, [address] [facility]  /   [email or web address]

## 2018-03-19 NOTE — DISCHARGE INSTRUCTIONS
Dolor de pecho: Instrucciones de cuidado - [ Chest Pain: Care Instructions ]  Instrucciones de cuidado    El dolor de pecho puede tener muchas causas. Algunas no son graves y mejorarán por sí solas en pocos días. [place] algunos tipos de dolor de pecho requieren más pruebas y [place]. Es posible que holbrook médico le haya recomendado sarika visita de seguimiento dentro de las 8 a 12 horas siguientes. Si no mejora, es posible que necesite [address] pruebas o [place]. Aunque holbrook médico le haya dado de [place], es necesario que esté atento a cualquier problema que se presente. El médico le hizo un cuidadoso chequeo, rogers a veces los problemas pueden aparecer posteriormente. Si tiene nuevos síntomas o éstos no mejoran, obtenga atención médica de inmediato. Si tiene dolor o presión en el pecho que empeora o es diferente y que dura más de 5 minutos, o se desmayó (perdió el conocimiento), llame al 911 o busque otra ayuda de emergencia de inmediato. Acudir a sarika consulta médica es sólo un paso en holbrook tratamiento. Aunque se sienta mejor, todavía deberá hacer lo que holbrook médico le recomiende, nury asistir a todas las visitas de seguimiento sugeridas y janes los medicamentos exactamente [employer]	[place] fueron indicados. [place] le ayudará a recuperarse y prevenir problemas futuros. ¿Cómo puede cuidarse en el hogar? · Descanse hasta que se sienta mejor. · [place] nabil medicamentos exactamente nury le fueron recetados. Llame a holbrook médico si zaira estar teniendo problemas con holbrook medicamento. · No conduzca después de janes un analgésico (medicamento para el dolor) recetado. ¿Cuándo debe pedir ayuda? Llame al 911 si:  ? · Se desmayó (perdió el conocimiento). ? · Tiene graves dificultades para respirar. ? · Tiene síntomas de un ataque al corazón. Estos podrían incluir:  ¨ Dolor o presión en el pecho, o sarika sensación extraña en el pecho. ¨ Sudoración. ¨ Falta de aire. ¨ Náuseas o vómito.   ¨ Dolor, presión o sarika sensación extraña en la espalda, el alda, la mandíbula, la parte superior del abdomen o en yaquelin o ambos hombros o brazos. ¨ Aturdimiento o debilidad repentina. ¨ Latidos del corazón rápidos o irregulares. Después de llamar al 911, es posible que el operador le diga que mastique 1 aspirina para adultos o de 2 a 4 aspirinas de dosis baja. Espere a sarika ambulancia. No intente conducir usted mismo. ? Llame hoy a holbrook médico si:  ? · Tiene cualquier dificultad para respirar. ? · El dolor en el pecho empeora. ? · [employer] o aturdimiento, o que está a punto de [place]. ? · No mejora nury se esperaba. ? · Tiene dolor de pecho nuevo o diferente. ¿Dónde puede encontrar más información en inglés? Noman Arguelles a http://nan-dandy.info/. Escriba A120 en la búsqueda para aprender más acerca de \"Dolor de pecho: Instrucciones de cuidado - [ Chest Pain: Care Instructions ]. \"  Revisado: 20 Althea Camacho 2017  Versión del contenido: 11.4  © 4169-8477 Healthwise, Incorporated. Las instrucciones de cuidado fueron adaptadas bajo licencia por [employer] (which disclaims liability or warranty for this information). Si usted tiene [place] afección médica o sobre estas instrucciones, siempre pregunte a holbrook profesional de tawanda. Healthwise, Incorporated niega toda garantía o responsabilidad por holbrook uso de esta información. Hipotiroidismo: Instrucciones de cuidado - [ Hypothyroidism: Care Instructions ]  Instrucciones de cuidado    Usted tiene hipotiroidismo, lo que significa que holbrook organismo no está fabricando suficiente hormona tiroidea. Esta hormona ayuda a que holbrook cuerpo use la energía. Si holbrook nivel de hormona tiroidea está bajo, usted podría sentirse cansado, estar estreñido, tener aumento de presión arterial, o tener piel seca o problemas de [place]. También podría tener frío con facilidad, aun cuando el clima sea caluroso.  Las mujeres con bajo nivel de tiroides quizás experimenten períodos [employer] abundantes. Se [place] un análisis de mariah para detectar holbrook nivel de hormona estimulante de tiroides (TSH, por nabil siglas en inglés) y comprobar el hipotiroidismo. Un nivel alto de TSH podría significar que usted tiene [address] de tiroides bajo. Cuando holbrook cuerpo no está produciendo suficiente hormona tiroidea, el nivel de TSH se eleva tratando de hacer que el cuerpo [address]. El tratamiento para el hipotiroidismo es janes pastillas de hormona tiroidea. [place] a sentirse mejor en 1 a 2 semanas. [place] pueden pasar varios meses para miguel a cambios en el nivel de TSH. Necesita consultas regulares con holbrook médico para asegurarse de que esté tomando la dosis correcta de [place]. La mayoría de las personas necesitarán tratamiento por el alexsandra de nabil vidas. Usted necesitará consultar regularmente al médico para hacerse análisis de [place] y asegurarse de que esté respondiendo edward. La atención de seguimiento es sarika parte clave de holbrook tratamiento y seguridad. Asegúrese de hacer y acudir a todas las citas, y llame a holbrook médico si está teniendo problemas. También es sarika buena idea saber los resultados de los exámenes y mantener sarika lista de los medicamentos que molly. ¿Cómo puede cuidarse en el hogar? · [place] ohlbrook medicamento de hormona tiroidea exactamente nury le fue indicado. Llame a holbrook médico si zaira estar teniendo problemas con holbrook medicamento. La mayoría de las personas no tienen efectos secundarios si brie con regularidad la cantidad correcta de medicamento. ¨ [place] el medicamento 30 minutos antes del desayuno y no lo jami junto con calcio, vitaminas o fatimah. ¨ No intente janes dosis adicionales de holbrook medicamento para la tiroides. Eso no lo ayuda a sentirse mejor más pronto y podría provocar efectos secundarios. ¨ Si olvida janes sarika dosis, NO tome sarika dosis doble del [place]. [place] la dosis normal al día siguiente.   · Infórmele a holbrook médico sobre todos los productos recetados, herbales o de venta [place] tome.  · Cuídese. Siga sarika dieta saludable, duerma lo suficiente y jami ejercicio con regularidad. ¿Cuándo debe pedir ayuda? Llame al 911 en cualquier momento que considere que necesita atención de emergencia. Por ejemplo, llame si:  ? · Se desmayó (perdió el conocimiento). ? · Tiene graves dificultades para respirar. ? · Tiene un ritmo cardíaco demasiado bajo (menos de 60 latidos por minuto). ? · Tiene sarika temperatura corporal baja (95°F [35°C] o brandon). ?Llame a holbrook médico ahora mismo o busque atención médica inmediata si:  ? · Se siente cansado, perezoso o débil. ? · Tiene dificultades para recordar cosas o concentrarse. ? · No empieza a sentirse mejor 2 semanas después de ildefonso empezado a janes el medicamento. ?Preste especial atención a los cambios en holbrook tawanda y asegúrese de comunicarse con holbrook médico si tiene algún problema. ¿Dónde puede encontrar más información en inglés? Michelle Creed a http://nan-dandy.info/. Ria Stevens F545 en la búsqueda para aprender más acerca de \"Hipotiroidismo: Instrucciones de cuidado - [ Hypothyroidism: Care Instructions ]. \"  Revisado: 12 womack, 2017  Versión del contenido: 11.4  © 1466-5616 Healthwise, Incorporated. Las instrucciones de cuidado fueron adaptadas bajo licencia por [employer] (which disclaims liability or warranty for this information). Si usted tiene [place] afección médica o sobre estas instrucciones, siempre pregunte a holbrook profesional de tawanda. Healthwise, Incorporated niega toda garantía o responsabilidad por holbrook uso de esta información.       DISCHARGE SUMMARY from Nurse    PATIENT INSTRUCTIONS:    After general anesthesia or intravenous sedation, for 24 hours or while taking prescription Narcotics:  · Limit your activities  · Do not drive and operate hazardous machinery  · Do not make important personal or business decisions  · Do  not drink alcoholic beverages  · If you have not urinated within 8 hours after discharge, please contact your surgeon on call. Report the following to your surgeon:  · Excessive pain, swelling, redness or odor of or around the surgical area  · Temperature over 100.5  · Nausea and vomiting lasting longer than 4 hours or if unable to take medications  · Any signs of decreased circulation or nerve impairment to extremity: change in color, persistent  numbness, tingling, coldness or increase pain  · Any questions    What to do at Home:    *  Please give a list of your current medications to your Primary Care Provider. *  Please update this list whenever your medications are discontinued, doses are      changed, or new medications (including over-the-counter products) are added. *  Please carry medication information at all times in case of emergency situations. These are general instructions for a healthy lifestyle:    No smoking/ No tobacco products/ Avoid exposure to second hand smoke  Surgeon General's Warning:  Quitting smoking now greatly reduces serious risk to your health. Obesity, smoking, and sedentary lifestyle greatly increases your risk for illness    A healthy diet, regular physical exercise & weight monitoring are important for maintaining a healthy lifestyle    You may be retaining fluid if you have a history of heart failure or if you experience any of the following symptoms:  Weight gain of 3 pounds or more overnight or 5 pounds in a week, increased swelling in our hands or feet or shortness of breath while lying flat in bed. Please call your doctor as soon as you notice any of these symptoms; do not wait until your next office visit. Recognize signs and symptoms of STROKE:    F-face looks uneven    A-arms unable to move or move unevenly    S-speech slurred or non-existent    T-time-call 911 as soon as signs and symptoms begin-DO NOT go       Back to bed or wait to see if you get better-TIME IS BRAIN.     Warning Signs of HEART ATTACK     Call 911 if you have these symptoms:   Chest discomfort. Most heart attacks involve discomfort in the center of the chest that lasts more than a few minutes, or that goes away and comes back. It can feel like uncomfortable pressure, squeezing, fullness, or pain.  Discomfort in other areas of the upper body. Symptoms can include pain or discomfort in one or both arms, the back, neck, jaw, or stomach.  Shortness of breath with or without chest discomfort.  Other signs may include breaking out in a cold sweat, nausea, or lightheadedness. Don't wait more than five minutes to call 911 - MINUTES MATTER! Fast action can save your life. Calling 911 is almost always the fastest way to get lifesaving treatment. Emergency Medical Services staff can begin treatment when they arrive -- up to an hour sooner than if someone gets to the hospital by car. The discharge information has been reviewed with the patient. The patient verbalized understanding. Discharge medications reviewed with the patient and appropriate educational materials and side effects teaching were provided.   ___________________________________________________________________________________________________________________________________

## 2018-03-19 NOTE — PROGRESS NOTES
Discharge instructions reviewed with patient and spouse. Opportunity for questions provided. Patient and spouse voiced understanding of all discharge instructions. Pt refusing to take medications. Pt educated on importance of taking  Medications and pt adamantly refusing.

## 2018-03-19 NOTE — ADT AUTH CERT NOTES
Facility Name: [facility]                               Patient Demographics      Patient Name Sex  Address Phone     Samuel Ramos Female 1980 [address] [place] [id number] [phone] (Home) *Preferred*  [phone] [facility] Account      Name Acct ID Class Status Primary Coverage     Samuel Ramos [id number] OBSERVATION Open  - [facility]  [facility]              Guarantor Account (for Hospital Account [de-identified])      Name Relation to Pt Service Area Active? Acct Type     Samuel Ramos Self [facility] Yes Personal/Family     Address Phone           [address] [facility] [address] [place] [id number] [phone](R)                Coverage Information (for Hospital Account [de-identified])      F/O Payor/Plan Subscriber  Subscriber Sex Precert #     /[facility]  [facility] [place] [address]      Subscriber Subscriber #     Radha Cox [id number]     [facility] # Group Name           Address Phone     [address]  [place] [address]      Policy Number Status Effective Date Benefits Phone     - -  -     Auth/Cert                   Admission Information - Patient Record Only      Arrival Date/Time:  Admit Date/Time: 2018 1522 IP Adm.  Date/Time:      Admission Type: Elective Point of Origin: Other Acute Hospital Admit Category:      Means of Arrival:  Primary Service: Medicine Secondary Service: N/A     Transfer Source:  Service Area: [address] [place] [address] Unit: [facility] 3 TELEMETRY     Admit Provider: Reg Ovalle MD Attending Provider: Reg Ovalle MD Referring Provider:        Admission Information      Attending Provider Admission Dx Admitted On       18     Service Isolation Code Status     MEDICINE  Full Code     Allergies           No Known Allergies         Admission Information      Unit/Bed: [facility] 3 TELEMETRY/01 Service: MEDICINE     Admitting provider: Reg Ovalle MD Phone: [phone]     Attending provider: Phone:      PCP: Johann Lama MD Phone: [phone]     Admission dx: Chest Pain with EKG Changes Patient class: V     Admission type: EL         Patient Demographics      Patient Name [address] Sex  Address Phone     Lonnie Damico [id number] Female 1980 [address] [facility] [address] [address] [phone] (Home) *Preferred*  [phone] (Mobile)       H&P Notes      H&P by Shawanda Suero MD at 18 1616 documented on Admission (Discharged) from 3/18/2018 in [facility] 3 TELEMETRY      Author: Shawanda Suero MD Author Type: Physician Filed: 18 4527     Note Status: Signed Cosign: Cosign Not Required Date of Service: 18     : Shawanda Suero MD (Physician)              Expand All Collapse All           [address] Cardiology History & Physical       Date of  Admission: 3/18/2018  3:22 PM                       CC: Chest pain     HPI:  Marguerite Mcmahon is a 42 y.o. female      No prior cardiac history. History of hypothyroidism, depression, noted prior to evaluation at [facility] for acute psychosis.     Presented to the emergency room at HOSPITAL [address] with chest  Discomfort. Patient is a very difficult historian who appears hinged on 'smell of gas' at home which causes her chest discomfort. States pressure-like sensation mostly noted with inhaling; no radiation or associated symptoms. States that when she leaves the house, all her symptoms resolved. Denies any dyspnea. Can walk over a mil no issues. No exertional symptoms. Very difficult to redirect patient keeps focusing back on 'the smell of gas' at home causing her symptoms. EKG at HOSPITAL [address] with noted diffuse inferolateral T-wave inversions. [address] , prior EKG unremarkable.  Patient was prescribed antidepressants which she is not taking and has also not been taking her thyroid medications           Past Medical History:   Diagnosis Date    Endocrine disease       hypothyroidism      Surgical history-hemorrhoidectomy     No Known Allergies   Social History            Social History    Marital status:        Spouse name: N/A    Number of children: N/A    Years of education: N/A          Occupational History    Not on file.           Social History Main Topics    Smoking status: Never Smoker    Smokeless tobacco: Never Used    Alcohol use No    Drug use: No    Sexual activity: Not on file           Other Topics Concern    Not on file      Social History Narrative      History reviewed. No pertinent family history.             Current Facility-Administered Medications   Medication Dose Route Frequency    ondansetron (ZOFRAN ODT) tablet 4 mg  4 mg Oral Q8H PRN    [START ON 3/19/2018] pantoprazole (PROTONIX) tablet 40 mg  40 mg Oral DAILY    sodium chloride (NS) flush 5-10 mL  5-10 mL IntraVENous Q8H    sodium chloride (NS) flush 5-10 mL  5-10 mL IntraVENous PRN    sodium chloride (NS) flush 5-10 mL  5-10 mL IntraVENous Q8H    sodium chloride (NS) flush 5-10 mL  5-10 mL IntraVENous PRN    [START ON 3/19/2018] aspirin chewable tablet 81 mg  81 mg Oral DAILY    nitroglycerin (NITROSTAT) tablet 0.4 mg  0.4 mg SubLINGual Q5MIN PRN    morphine injection 2 mg  2 mg IntraVENous Q4H PRN    perflutren lipid microspheres (DEFINITY) in NS bolus IV  1 mL IntraVENous PRN         Review of Systems     Review of Systems   Constitution: Negative for chills, decreased appetite, fever, malaise/fatigue, weight gain and weight loss. HENT: Negative for hearing loss and sore throat. Eyes: Negative for blurred vision and double vision. Cardiovascular: Positive for chest pain. Negative for dyspnea on exertion, orthopnea, palpitations, paroxysmal nocturnal dyspnea and syncope. Respiratory: Negative for cough and shortness of breath. Endocrine: Negative for cold intolerance and heat intolerance. Hematologic/Lymphatic: Negative for bleeding problem.    Musculoskeletal: Negative for falls, muscle cramps, muscle weakness and myalgias. Gastrointestinal: Negative for abdominal pain, hematemesis, hematochezia and melena. Neurological: Negative for dizziness and headaches. Psychiatric/Behavioral: Negative for altered mental status.  The patient is not nervous/anxious.              Subjective:           Visit Vitals    /77 (BP 1 Location: Left arm, BP Patient Position: At rest)    Pulse 73    Temp 98.1 °F (36.7 °C)    Resp 16    Ht 5' 3\" (1.6 m)    Wt 52.1 kg (114 lb 14.4 oz)    SpO2 100%    BMI 20.35 kg/m2            Physical Exam:  General: Well Developed, Well Nourished, No Acute Distress  HEENT: pupils equal and round, no abnormalities noted  Neck: supple, no JVD, no carotid bruits  Heart: S1S2 with RRR without murmurs or gallops  Lungs: Clear throughout auscultation bilaterally without adventitious sounds  Abd: soft, nontender, nondistended, with good bowel sounds  Ext: warm, no edema, calves supple/nontender, pulses 2+ bilaterally  Skin: warm and dry  Psychiatric: Normal mood and affect  Neurologic: Alert and oriented X 3     Labs:    Recent Results           Recent Results (from the past 24 hour(s))   EKG, 12 LEAD, INITIAL     Collection Time: 03/18/18 12:44 PM   Result Value Ref Range     Ventricular Rate 102 BPM     Atrial Rate 102 BPM     P-R Interval 128 ms     QRS Duration 74 ms     Q-T Interval 338 ms     QTC Calculation (Bezet) 440 ms     Calculated P Axis 43 degrees     Calculated R Axis 107 degrees     Calculated T Axis -67 degrees     Diagnosis           Sinus tachycardia  Rightward axis  ST & T wave abnormality, consider inferior ischemia  ST & T wave abnormality, consider anterolateral ischemia  Abnormal ECG  No previous ECGs available   CBC WITH AUTOMATED DIFF     Collection Time: 03/18/18  1:07 PM   Result Value Ref Range     WBC 11.3 (H) 4.3 - 11.1 K/uL     RBC 4.93 4.05 - 5.25 M/uL     HGB 14.5 11.7 - 15.4 g/dL     HCT 41.4 35.8 - 46.3 %     MCV 84.0 79.6 - 97.8 FL     MCH 29.4 26.1 - 32.9 PG     MCHC 35.0 31.4 - 35.0 g/dL     RDW 12.9 11.9 - 14.6 %     PLATELET 413 154 - 135 K/uL     MPV 10.8 10.8 - 14.1 FL     DF AUTOMATED        NEUTROPHILS 79 (H) 43 - 78 %     LYMPHOCYTES 11 (L) 13 - 44 %     MONOCYTES 10 4.0 - 12.0 %     EOSINOPHILS 0 (L) 0.5 - 7.8 %     BASOPHILS 0 0.0 - 2.0 %     IMMATURE GRANULOCYTES 0 0.0 - 5.0 %     ABS. NEUTROPHILS 8.9 (H) 1.7 - 8.2 K/UL     ABS. LYMPHOCYTES 1.3 0.5 - 4.6 K/UL     ABS. MONOCYTES 1.1 0.1 - 1.3 K/UL     ABS. EOSINOPHILS 0.0 0.0 - 0.8 K/UL     ABS. BASOPHILS 0.0 0.0 - 0.2 K/UL     ABS. IMM. GRANS. 0.0 0.0 - 0.5 K/UL   METABOLIC PANEL, BASIC     Collection Time: 03/18/18  1:07 PM   Result Value Ref Range     Sodium 139 136 - 145 mmol/L     Potassium 3.0 (L) 3.5 - 5.1 mmol/L     Chloride 98 98 - 107 mmol/L     CO2 29 21 - 32 mmol/L     Anion gap 12 7 - 16 mmol/L     Glucose 158 (H) 65 - 100 mg/dL     BUN 9 6 - 23 MG/DL     Creatinine 0.66 0.6 - 1.0 MG/DL     GFR est AA >60 >60 ml/min/1.73m2     GFR est non-AA >60 >60 ml/min/1.73m2     Calcium 9.6 8.3 - 10.4 MG/DL   HEPATIC FUNCTION PANEL     Collection Time: 03/18/18  1:07 PM   Result Value Ref Range     Protein, total 7.7 6.3 - 8.2 g/dL     Albumin 3.8 3.5 - 5.0 g/dL     Globulin 3.9 (H) 2.3 - 3.5 g/dL     A-G Ratio 1.0 (L) 1.2 - 3.5       Bilirubin, total 0.6 0.2 - 1.1 MG/DL     Bilirubin, direct 0.1 <0.4 MG/DL     Alk.  phosphatase 47 (L) 50 - 136 U/L     AST (SGOT) 16 15 - 37 U/L     ALT (SGPT) 15 12 - 65 U/L   POC TROPONIN-I     Collection Time: 03/18/18  1:07 PM   Result Value Ref Range     Troponin-I (POC) 0.02 0.02 - 0.05 ng/ml   D DIMER     Collection Time: 03/18/18  1:07 PM   Result Value Ref Range     D DIMER 0.55 <0.56 ug/ml(FEU)   EKG, 12 LEAD, INITIAL     Collection Time: 03/18/18  3:47 PM   Result Value Ref Range     Ventricular Rate 76 BPM     Atrial Rate 76 BPM     P-R Interval 132 ms     QRS Duration 78 ms     Q-T Interval 390 ms     QTC Calculation (Bezet) 438 ms     Calculated P Axis 46 degrees     Calculated R Axis 39 degrees     Calculated T Axis -5 degrees     Diagnosis           Normal sinus rhythm  T wave abnormality, consider inferior ischemia  Abnormal ECG  When compared with ECG of 18-MAR-2018 12:44,  QRS axis Shifted left  T wave inversion no longer evident in Anterolateral leads             Assessment/Plan:       1. Atypical chest discomfort  2. History of depression  3. Hypothyroidism  4. Prior acute psychosis  5. Hypokalemia     Patient with atypical chest discomfort but initial EKG diffuse inferolateral T wave changes. Repeat EKG with improved EKG changes but still some residual inferior T-wave changes. Noted hypokalemia which could contribute; will replete. Rule out with serial enzymes. Plan echo in the morning. Consideration for stress testing if noted EKG changes persist with potassium repletion. Check free T4; TSH mildly elevated. Further recommendations pending clinical course.      Bryan Albrecht MD  3/18/2018 4:25 PM                     Patient Demographics      Patient Name [address] [facility] Sex  Address Phone     Andrea Valladares [id number] Female 1980 [address] [place] [address]  [address] [address] [phone] (Home) *Preferred*  [phone] (Mobile)         CSN:     [id number]         Admit Date: Admit Time Room Bed     Mar 18, 2018  3:22  [95612] 01 [801]       Attending Providers      Provider Pager From To     Bryan Albrecht MD  18       Emergency Contact(s)      Name Relation Home Work [address] [place] [address] [phone]         Utilization Review         LOC:Acute Adult-Acute Coronary Syndrome (ACS) by Felisha Gutierrez RN      Review Entered Review Status     3/19/2018 In Primary     Details       REVIEW SUMMARY     Patient: HAROON HOUSTON  Review Number: [id number]  Review Status:  In Primary     Condition Specific: Yes        OUTCOMES  Outcome Type: Primary           REVIEW DETAILS     Admit Date: 2018  Product: LOC:Acute Adult  Subset: Acute Coronary Syndrome (ACS)      (Symptom or finding within 24h)         (Excludes PO medications unless noted)          Select Day, One:              [ ] Episode Day 1, One:                  [ ] INTERMEDIATE, Both:                      [ ] Intervention, All:                      ~--Admin, IQ Admin Admin on 03- 11:44 AM--~                      97.2, 106/71, , RR 20, O2 sat 100, Ra                          Meds: potassium po,                           Labs: Bc 11.3, neutron 79, Lymoh 11, Eosin 0, potassium 3.0, gluose 158, globulin 3.9, Alk phos 47                          ECHO:                       Left ventricle: Systolic function was normal. Ejection fraction was                      estimated in the range of 55 % to 60 %. There were no regional wall motion                      abnormalities.                         EKG: Sinus tachycardia                       Rightward axis                       ST & T wave abnormality, consider inferior ischemia                       ST & T wave abnormality, consider anterolateral ischemia                       Abnormal ECG                          CHEST XR:                      No significant interval change.                                          [X] Anticoagulant, administered or contraindicated                          [X] Oxygenation, One:                              [X] O2 sat >= 90%(0.90) or baseline                          [X] Continuous cardiac monitoring (excludes Holter)     Version: InterQual® 2017.2  Amandeep Prince and [employer]®  © 2017 [employer] and/or one of its subsidiaries. All Rights Reserved. CPT only © 2016 American Medical Association.   All Rights Reserved.            Additional Notes     Presented to the emergency room at [place] [address] [facility] with chest Enrike Hazy is a very difficult historian who appears hinged on 'smell of gas' at home which causes her chest discomfort.  States pressure-like sensation mostly noted with inhaling; no radiation or associated symptoms.   States that when she leaves the house, all her symptoms resolved.  Denies any dyspnea.  Can walk over a mil no issues. No exertional symptoms.  Very difficult to redirect patient keeps focusing back on 'the smell of gas' at home causing her symptoms.  EKG at HOSPITAL [address] with noted diffuse inferolateral T-wave inversions. [address] , prior EKG unremarkable. Patient was prescribed antidepressants which she is not taking and has also not been taking her thyroid medications                 1. Atypical chest discomfort     2. History of depression     3. Hypothyroidism     4.  Prior acute psychosis     5. Hypokalemia           Patient with atypical chest discomfort but initial EKG diffuse inferolateral T wave changes.  Repeat EKG with improved EKG changes but still some residual inferior T-wave changes. Noted hypokalemia which could contribute; will replete. Rule out with serial enzymes.  Plan echo in the morning.  Consideration for stress testing if noted EKG changes persist with potassium repletion. Check free T4; TSH mildly elevated. Further recommendations pending clinical course.      Patient Demographics      Patient Name [address] Sex  Address Phone     Johnny Garcia [id number] Female 1980 [address] [facility] [address] [phone] (Home) *Preferred*  [phone] (Mobile)       Discharge Information      Discharge Provider Date/Time Disposition Destination     Robert Camarillo MD / [phone] 18 1439 Home or Self Care (none)     Comments     (none)

## 2018-03-19 NOTE — DISCHARGE SUMMARY
[address] [place] [address] 121 Cardiology Discharge Summary     Patient ID:  Patti Carroll  [id number]  89 y.o.  1980    Admit date: 3/18/2018    Discharge date and time:  03-    Admitting Physician: Reyna Luciano MD     Discharge Physician[facility]aut[facility]    Admission Diagnoses: Chest Pain with EKG Changes  Chest pain    Discharge Diagnoses:   Patient Active Problem List    Diagnosis Date Noted    Chest pain 03/18/2018       Cardiology Procedures this admission:  Nuclear stress test  EchoCardiogram  Consults: None    Hospital Course: Pt was admitted with complaints of atypical chest pain with normal cardiac enzymes. She was also complaining of smelling gas, but her home is  Pt was scheduled for a Lexiscan nuclear stress test to evaluate for ischemia. Pt had Lexiscan nuclear stress test without complication. Images showed no evidence of reversible ischemia. Pt tolerated the procedure well and was taken to the telemetry floor for recovery. Pt was up feeling well without any complaints of CP, SOB or palpitations. Pt's labs were WNL. Pt was seen and examined by Dr. LATIF [facility] and determined stable and ready for discharge. Pt is on PPI at home, but refuses to take it,  will increase to BID. She adamantly refused medications while in the hospital including her synthroid. Extensive education was provided regarding importance of taking medications as prescribed and following up with PCP. DISPOSITION: The patient is being discharged home in stable condition on a low saturated fat, low cholesterol and low salt diet. Pt is instructed to advance activities as tolerated limited to fatigue or shortness of breath. Pt is instructed to call office or return to ER for immediate evaluation of any shortness of breath or chest pain. Follow up with [address] [place] [address] 121 Cardiology as needed  Follow up with PCP Dr. Bishop Montilla in 7-10 days.      Discharge Exam:   Visit Vitals    /81    Pulse (!) 104    Temp 98.5 °F (36.9 °C)    Resp 18    Ht 5' 3\" (1.6 m)    Wt 51.3 kg (113 lb)    SpO2 99%    BMI 20.02 kg/m2      Recent Results (from the past 24 hour(s))   EKG, 12 LEAD, INITIAL    Collection Time: 03/18/18  3:47 PM   Result Value Ref Range    Ventricular Rate 76 BPM    Atrial Rate 76 BPM    P-R Interval 132 ms    QRS Duration 78 ms    Q-T Interval 390 ms    QTC Calculation (Bezet) 438 ms    Calculated P Axis 46 degrees    Calculated R Axis 39 degrees    Calculated T Axis -5 degrees    Diagnosis       Normal sinus rhythm  T wave abnormality, consider inferior ischemia  Abnormal ECG  When compared with ECG of 18-MAR-2018 12:44,  QRS axis Shifted left  T wave inversion no longer evident in Anterolateral leads  Confirmed by Marquis Cheri AUGUSTIN (), RAMANA PURDY ([id number]) on 3/18/2018 7:06:04 PM     TROPONIN I    Collection Time: 03/18/18  7:22 PM   Result Value Ref Range    Troponin-I, Qt. <0.02 (L) 0.02 - 0.05 NG/ML   TROPONIN I    Collection Time: 03/19/18  1:36 AM   Result Value Ref Range    Troponin-I, Qt. <0.02 (L) 0.02 - 8.76 NG/ML   METABOLIC PANEL, BASIC    Collection Time: 03/19/18  4:11 AM   Result Value Ref Range    Sodium 139 136 - 145 mmol/L    Potassium 3.5 3.5 - 5.1 mmol/L    Chloride 102 98 - 107 mmol/L    CO2 25 21 - 32 mmol/L    Anion gap 12 7 - 16 mmol/L    Glucose 96 65 - 100 mg/dL    BUN 6 6 - 23 MG/DL    Creatinine 0.51 (L) 0.6 - 1.0 MG/DL    GFR est AA >60 >60 ml/min/1.73m2    GFR est non-AA >60 >60 ml/min/1.73m2    Calcium 8.3 8.3 - 10.4 MG/DL   LIPID PANEL    Collection Time: 03/19/18  4:11 AM   Result Value Ref Range    LIPID PROFILE          Cholesterol, total 124 <200 MG/DL    Triglyceride 66 35 - 150 MG/DL    HDL Cholesterol 39 (L) 40 - 60 MG/DL    LDL, calculated 71.8 <100 MG/DL    VLDL, calculated 13.2 6.0 - 23.0 MG/DL    CHOL/HDL Ratio 3.2           Patient Instructions:     Current Discharge Medication List      START taking these medications    Details   levothyroxine (SYNTHROID) 125 mcg tablet Take 1 Tab by mouth Daily (before breakfast). Qty: 30 Tab, Refills: 1         CONTINUE these medications which have CHANGED    Details   pantoprazole (PROTONIX) 40 mg tablet Take 1 Tab by mouth daily.   Qty: 60 Tab, Refills: 3         STOP taking these medications       ondansetron (ZOFRAN ODT) 4 mg disintegrating tablet Comments:   Reason for Stopping:                 Signed:  Hannah Harry NP  3/19/2018  1:35 PM

## 2024-06-07 ENCOUNTER — APPOINTMENT (RX ONLY)
Dept: URBAN - METROPOLITAN AREA CLINIC 93 | Facility: CLINIC | Age: 44
Setting detail: DERMATOLOGY
End: 2024-06-07

## 2024-06-07 DIAGNOSIS — L259 CONTACT DERMATITIS AND OTHER ECZEMA, UNSPECIFIED CAUSE: ICD-10-CM | Status: INADEQUATELY CONTROLLED

## 2024-06-07 PROBLEM — L23.9 ALLERGIC CONTACT DERMATITIS, UNSPECIFIED CAUSE: Status: ACTIVE | Noted: 2024-06-07

## 2024-06-07 PROCEDURE — 99204 OFFICE O/P NEW MOD 45 MIN: CPT

## 2024-06-07 PROCEDURE — ? PRESCRIPTION MEDICATION MANAGEMENT

## 2024-06-07 PROCEDURE — ? PRESCRIPTION

## 2024-06-07 PROCEDURE — ? COUNSELING

## 2024-06-07 RX ORDER — CICLOPIROX OLAMINE 7.7 MG/G
CREAM TOPICAL
Qty: 90 | Refills: 1 | Status: ERX | COMMUNITY
Start: 2024-06-07

## 2024-06-07 RX ORDER — TRIAMCINOLONE ACETONIDE 1 MG/G
CREAM TOPICAL
Qty: 80 | Refills: 1 | Status: ERX | COMMUNITY
Start: 2024-06-07

## 2024-06-07 RX ORDER — MOMETASONE FUROATE 1 MG/G
CREAM TOPICAL
Qty: 45 | Refills: 0 | Status: ERX | COMMUNITY
Start: 2024-06-07

## 2024-06-07 RX ADMIN — CICLOPIROX OLAMINE: 7.7 CREAM TOPICAL at 00:00

## 2024-06-07 RX ADMIN — MOMETASONE FUROATE: 1 CREAM TOPICAL at 00:00

## 2024-06-07 RX ADMIN — TRIAMCINOLONE ACETONIDE: 1 CREAM TOPICAL at 00:00

## 2024-06-07 ASSESSMENT — LOCATION SIMPLE DESCRIPTION DERM
LOCATION SIMPLE: LEFT AXILLARY VAULT
LOCATION SIMPLE: RIGHT AXILLARY VAULT

## 2024-06-07 ASSESSMENT — LOCATION DETAILED DESCRIPTION DERM
LOCATION DETAILED: LEFT AXILLARY VAULT
LOCATION DETAILED: RIGHT AXILLARY VAULT

## 2024-06-07 ASSESSMENT — ITCH NUMERIC RATING SCALE: HOW SEVERE IS YOUR ITCHING?: 5

## 2024-06-07 ASSESSMENT — BSA RASH: BSA RASH: 5

## 2024-06-07 ASSESSMENT — LOCATION ZONE DERM: LOCATION ZONE: AXILLAE

## 2024-06-07 NOTE — PROCEDURE: PRESCRIPTION MEDICATION MANAGEMENT
Render In Strict Bullet Format?: No
Detail Level: Zone
Initiate Treatment: RX Ciclopirox cream bid mix with \\nTAC.1% cream on trunk \\nRX mometasone cream for axillae